# Patient Record
Sex: FEMALE | Race: WHITE | NOT HISPANIC OR LATINO
[De-identification: names, ages, dates, MRNs, and addresses within clinical notes are randomized per-mention and may not be internally consistent; named-entity substitution may affect disease eponyms.]

---

## 2019-01-15 ENCOUNTER — NON-APPOINTMENT (OUTPATIENT)
Age: 54
End: 2019-01-15

## 2019-01-15 ENCOUNTER — APPOINTMENT (OUTPATIENT)
Dept: CARDIOLOGY | Facility: CLINIC | Age: 54
End: 2019-01-15
Payer: COMMERCIAL

## 2019-01-15 VITALS
DIASTOLIC BLOOD PRESSURE: 74 MMHG | HEART RATE: 74 BPM | WEIGHT: 185 LBS | HEIGHT: 66 IN | SYSTOLIC BLOOD PRESSURE: 118 MMHG | BODY MASS INDEX: 29.73 KG/M2

## 2019-01-15 DIAGNOSIS — Z86.69 PERSONAL HISTORY OF OTHER DISEASES OF THE NERVOUS SYSTEM AND SENSE ORGANS: ICD-10-CM

## 2019-01-15 DIAGNOSIS — K20.0 EOSINOPHILIC ESOPHAGITIS: ICD-10-CM

## 2019-01-15 DIAGNOSIS — Z87.39 PERSONAL HISTORY OF OTHER DISEASES OF THE MUSCULOSKELETAL SYSTEM AND CONNECTIVE TISSUE: ICD-10-CM

## 2019-01-15 DIAGNOSIS — Z86.39 PERSONAL HISTORY OF OTHER ENDOCRINE, NUTRITIONAL AND METABOLIC DISEASE: ICD-10-CM

## 2019-01-15 DIAGNOSIS — Z86.19 PERSONAL HISTORY OF OTHER INFECTIOUS AND PARASITIC DISEASES: ICD-10-CM

## 2019-01-15 DIAGNOSIS — Z82.49 FAMILY HISTORY OF ISCHEMIC HEART DISEASE AND OTHER DISEASES OF THE CIRCULATORY SYSTEM: ICD-10-CM

## 2019-01-15 DIAGNOSIS — Z87.19 PERSONAL HISTORY OF OTHER DISEASES OF THE DIGESTIVE SYSTEM: ICD-10-CM

## 2019-01-15 DIAGNOSIS — Z85.828 PERSONAL HISTORY OF OTHER MALIGNANT NEOPLASM OF SKIN: ICD-10-CM

## 2019-01-15 PROCEDURE — 93000 ELECTROCARDIOGRAM COMPLETE: CPT

## 2019-01-15 PROCEDURE — 99204 OFFICE O/P NEW MOD 45 MIN: CPT

## 2019-01-15 RX ORDER — ALBUTEROL SULFATE 90 UG/1
108 (90 BASE) AEROSOL, METERED RESPIRATORY (INHALATION)
Refills: 0 | Status: ACTIVE | COMMUNITY

## 2019-01-15 NOTE — ASSESSMENT
[FreeTextEntry1] : Chest discomfort. Palpitations. Abnormal EKG. \par Unable to achieve adequate heart rate on outside exercise tolerance test. \par Remote heart murmur. \par Hyperlipidemia. \par \par Nuclear stress test to evaluate cardiovascular response to exercise.\par Echocardiogram to rule out structural heart disease. \par Carotid Doppler. \par  \par Daily statin therapy. \par \par Follow up event monitor results from outside office.

## 2019-01-15 NOTE — HISTORY OF PRESENT ILLNESS
[FreeTextEntry1] : SEAN SHIN  is a 53 year old  F\par There is a history of Lyme disease, hyperlipidemia, GERD, asthma, and anxiety. \par Told of heart murmur at [facility]. \par Remote evaluation with Dr. Lorenzo, symptoms attributed to lyme disease.\par \par There is no prior history of a clinical myocardial infarction, coronary revascularization. \par There is no history of symptomatic congestive heart failure rheumatic heart disease\par There is no history of symptomatic arrhythmias including atrial fibrillation.\par \par Has episodes of chest discomfort. Chest discomfort, described as substernal with radiation to the left side, sharp, relates to stress. \par Reccurrent episodes, which last a brief period \par Resolves on its own.  No clear precipitant. \par Moderate to severe intensity 7/10 intensity \par Feels the need to grab her chest\par Associated sensation of her heart racing.\par Saw [facility]. At that time,  unable to get her heart rate up on the treadmill. \par Given MCOT monitor\par Presents for further evaluation. \par There is no significant dyspnea on exertion or orthopnea. \par \par Prior musculoskeletal injury from trauma with resulting surgery.\par  \par EKG demonstrates normal sinus rhythm with nonspecific ST changes. \par \par Her mother has history of carotid disease and prior stroke. \par \par Last blood work, October 2018. , total cholesterol 262, potassium 4.3, hemoglobin 13.2. TSH 2.1.

## 2019-01-15 NOTE — REASON FOR VISIT
[Consultation] : a consultation regarding [Abnormal ECG] : an abnormal ECG [Chest Pain] : chest pain

## 2019-01-15 NOTE — PHYSICAL EXAM
[General Appearance - Well Developed] : well developed [Normal Appearance] : normal appearance [Well Groomed] : well groomed [General Appearance - Well Nourished] : well nourished [No Deformities] : no deformities [General Appearance - In No Acute Distress] : no acute distress [Normal Conjunctiva] : the conjunctiva exhibited no abnormalities [Eyelids - No Xanthelasma] : the eyelids demonstrated no xanthelasmas [Normal Oral Mucosa] : normal oral mucosa [No Oral Pallor] : no oral pallor [No Oral Cyanosis] : no oral cyanosis [Normal Jugular Venous A Waves Present] : normal jugular venous A waves present [Normal Jugular Venous V Waves Present] : normal jugular venous V waves present [No Jugular Venous Cruz A Waves] : no jugular venous cruz A waves [Heart Rate And Rhythm] : heart rate and rhythm were normal [Heart Sounds] : normal S1 and S2 [Respiration, Rhythm And Depth] : normal respiratory rhythm and effort [Exaggerated Use Of Accessory Muscles For Inspiration] : no accessory muscle use [Auscultation Breath Sounds / Voice Sounds] : lungs were clear to auscultation bilaterally [Abdomen Soft] : soft [Abdomen Tenderness] : non-tender [Abdomen Mass (___ Cm)] : no abdominal mass palpated [Abnormal Walk] : normal gait [Gait - Sufficient For Exercise Testing] : the gait was sufficient for exercise testing [Nail Clubbing] : no clubbing of the fingernails [Cyanosis, Localized] : no localized cyanosis [Petechial Hemorrhages (___cm)] : no petechial hemorrhages [Skin Color & Pigmentation] : normal skin color and pigmentation [] : no rash [No Venous Stasis] : no venous stasis [Skin Lesions] : no skin lesions [No Skin Ulcers] : no skin ulcer [No Xanthoma] : no  xanthoma was observed [Oriented To Time, Place, And Person] : oriented to person, place, and time [Affect] : the affect was normal [Mood] : the mood was normal [FreeTextEntry1] : anxiety

## 2019-01-15 NOTE — REVIEW OF SYSTEMS
[Feeling Fatigued] : feeling fatigued [Chest Pain] : chest pain [Palpitations] : palpitations [Joint Pain] : joint pain [Joint Stiffness] : joint stiffness [Dizziness] : dizziness [see HPI] : see HPI [Anxiety] : anxiety [Under Stress] : under stress [Negative] : Heme/Lymph

## 2019-01-17 ENCOUNTER — TRANSCRIPTION ENCOUNTER (OUTPATIENT)
Age: 54
End: 2019-01-17

## 2019-01-18 ENCOUNTER — APPOINTMENT (OUTPATIENT)
Dept: CARDIOLOGY | Facility: CLINIC | Age: 54
End: 2019-01-18

## 2019-01-30 ENCOUNTER — APPOINTMENT (OUTPATIENT)
Dept: CARDIOLOGY | Facility: CLINIC | Age: 54
End: 2019-01-30
Payer: COMMERCIAL

## 2019-01-30 PROCEDURE — 78452 HT MUSCLE IMAGE SPECT MULT: CPT

## 2019-01-30 PROCEDURE — 93015 CV STRESS TEST SUPVJ I&R: CPT

## 2019-01-30 PROCEDURE — 93880 EXTRACRANIAL BILAT STUDY: CPT

## 2019-01-30 PROCEDURE — 93306 TTE W/DOPPLER COMPLETE: CPT

## 2019-01-30 PROCEDURE — A9502: CPT

## 2019-02-04 ENCOUNTER — APPOINTMENT (OUTPATIENT)
Dept: CARDIOLOGY | Facility: CLINIC | Age: 54
End: 2019-02-04
Payer: COMMERCIAL

## 2019-02-04 VITALS
HEART RATE: 80 BPM | SYSTOLIC BLOOD PRESSURE: 118 MMHG | BODY MASS INDEX: 29.73 KG/M2 | DIASTOLIC BLOOD PRESSURE: 68 MMHG | HEIGHT: 66 IN | WEIGHT: 185 LBS

## 2019-02-04 PROCEDURE — 99214 OFFICE O/P EST MOD 30 MIN: CPT

## 2019-02-04 NOTE — REVIEW OF SYSTEMS
[Palpitations] : palpitations [Joint Pain] : joint pain [Joint Stiffness] : joint stiffness [see HPI] : see HPI [Anxiety] : anxiety [Under Stress] : under stress [Negative] : Heme/Lymph [Feeling Fatigued] : not feeling fatigued [Chest Pain] : no chest pain [Dizziness] : no dizziness

## 2019-02-04 NOTE — PHYSICAL EXAM
[General Appearance - Well Developed] : well developed [Normal Appearance] : normal appearance [Well Groomed] : well groomed [General Appearance - Well Nourished] : well nourished [No Deformities] : no deformities [General Appearance - In No Acute Distress] : no acute distress [Normal Conjunctiva] : the conjunctiva exhibited no abnormalities [Eyelids - No Xanthelasma] : the eyelids demonstrated no xanthelasmas [Normal Oral Mucosa] : normal oral mucosa [No Oral Pallor] : no oral pallor [No Oral Cyanosis] : no oral cyanosis [Normal Jugular Venous A Waves Present] : normal jugular venous A waves present [Normal Jugular Venous V Waves Present] : normal jugular venous V waves present [No Jugular Venous Cruz A Waves] : no jugular venous cruz A waves [Respiration, Rhythm And Depth] : normal respiratory rhythm and effort [Exaggerated Use Of Accessory Muscles For Inspiration] : no accessory muscle use [Auscultation Breath Sounds / Voice Sounds] : lungs were clear to auscultation bilaterally [Heart Rate And Rhythm] : heart rate and rhythm were normal [Heart Sounds] : normal S1 and S2 [Abdomen Soft] : soft [Abdomen Tenderness] : non-tender [Abdomen Mass (___ Cm)] : no abdominal mass palpated [Abnormal Walk] : normal gait [Gait - Sufficient For Exercise Testing] : the gait was sufficient for exercise testing [Nail Clubbing] : no clubbing of the fingernails [Cyanosis, Localized] : no localized cyanosis [Petechial Hemorrhages (___cm)] : no petechial hemorrhages [Skin Color & Pigmentation] : normal skin color and pigmentation [] : no rash [No Venous Stasis] : no venous stasis [Skin Lesions] : no skin lesions [No Skin Ulcers] : no skin ulcer [No Xanthoma] : no  xanthoma was observed [Oriented To Time, Place, And Person] : oriented to person, place, and time [Affect] : the affect was normal [Mood] : the mood was normal [FreeTextEntry1] : anxiety

## 2019-02-04 NOTE — ASSESSMENT
[FreeTextEntry1] : SEAN SHIN  is a 53 year F  who presents today Feb 04, 2019 in clinical follow-up with the above history and the following active issues. \par \par Prior complaints of chest discomfort with history of  Abnormal EKG. \par Unable to achieve adequate heart rate on outside exercise tolerance test. \par Nuclear stress test without evidence of ischemia.  Echocardiogram demonstrates normal resting cardiac structure and function with an ejection fraction of 60%.  Limitations of noninvasive testing reviewed with the patient.\par \par Carotid duplex with mild nonobstructive disease.  Incidental finding of right thyroid nodule.  Prescription for dedicated thyroid ultrasound provided.  Patient understands the importance of clinical followup with primary care and endocrinology.\par \par Hyperlipidemia, low cholesterol diet reviewed. Lifestyle and risk factor modification. \par Daily statin therapy. \par \par Follow up event monitor results from outside office.\par \par Clinical follow-up with Dr. Toledo in 6 months unless symptoms warrant sooner emergent evaluation\par \par Red flag symptoms which would warrant sooner emergent evaluation reviewed with the patient. \par Questions and concerns were addressed and answered. \par \par Sincerely,\par \par Martha Iverson PA-C\par Patients history, testing and plan reviewed with supervising MD: Dr. Larissa Mccoy

## 2019-02-04 NOTE — HISTORY OF PRESENT ILLNESS
[FreeTextEntry1] : SEAN SHIN  is a 53 year old  F\par There is a history of Lyme disease, hyperlipidemia, GERD, asthma, and anxiety. \par Told of heart murmur at [facility]. \par Remote evaluation with Dr. Lorenzo, symptoms attributed to lyme disease.\par \par At her last office visit on January 15, 2019 echocardiogram, carotid duplex and nuclear stress test recommended.  Testing has been performed and she presents today to review the results.  She previously had a cardiac event monitor placed at [facility].  Patient states that she wore the monitor for 2 weeks.  She is not certain of the company but will call our office with the information so we can track down the results.\par Since last seen there have been no recurrent episodes of chest pain.\par There have been occasional episodes of palpitations - usually at night. \par \par There is no prior history of a clinical myocardial infarction, coronary revascularization. \par There is no history of symptomatic congestive heart failure rheumatic heart disease\par There is no history of symptomatic arrhythmias including atrial fibrillation.\par \par Prior episodes of chest pain and discomfort. \par Evaluated at  cardiology. At that time,  unable to get her heart rate up on the treadmill. \par Given MCOT monitor\par \par \par Today he denies chest pain, pressure, unusual shortness of breath, lightheadedness, dizziness, near syncope or syncope. \par \par Prior musculoskeletal injury from trauma with resulting surgery.\par \par Carotid duplex January 30, 2019 nonobstructive disease, right thyroid nodules noted\par \par Echocardiogram January 30, 2019 ejection fraction 60%, a descending aorta 3.9 cm, minimal mitral regurgitation\par \par Nuclear stress test January 30, 2019 no evidence of ischemia, breast attenuation noted\par  \par EKG demonstrates normal sinus rhythm with nonspecific ST changes. \par \par Her mother has history of carotid disease and prior stroke. \par \par Last blood work, October 2018. , total cholesterol 262, potassium 4.3, hemoglobin 13.2. TSH 2.1.

## 2019-08-13 ENCOUNTER — APPOINTMENT (OUTPATIENT)
Dept: CARDIOLOGY | Facility: CLINIC | Age: 54
End: 2019-08-13
Payer: MEDICARE

## 2019-08-13 VITALS
HEIGHT: 66 IN | OXYGEN SATURATION: 95 % | DIASTOLIC BLOOD PRESSURE: 60 MMHG | BODY MASS INDEX: 28.93 KG/M2 | WEIGHT: 180 LBS | SYSTOLIC BLOOD PRESSURE: 110 MMHG | HEART RATE: 74 BPM

## 2019-08-13 DIAGNOSIS — J45.909 UNSPECIFIED ASTHMA, UNCOMPLICATED: ICD-10-CM

## 2019-08-13 PROCEDURE — 0296T: CPT

## 2019-08-13 PROCEDURE — 99214 OFFICE O/P EST MOD 30 MIN: CPT

## 2019-08-13 RX ORDER — GABAPENTIN 300 MG
300 TABLET ORAL 3 TIMES DAILY
Refills: 0 | Status: DISCONTINUED | COMMUNITY
End: 2019-08-13

## 2019-08-18 PROBLEM — J45.909 ASTHMA: Status: ACTIVE | Noted: 2019-01-15

## 2019-09-02 NOTE — HISTORY OF PRESENT ILLNESS
[FreeTextEntry1] : SEAN SHIN  is a 54 year old  F\par There is a history of Lyme disease, hyperlipidemia, GERD, asthma, and anxiety. \par Told of heart murmur at [facility]. \par Remote evaluation with Dr. Lorenzo, symptoms attributed to lyme disease.\par \par She previously had a cardiac event monitor placed at [facility] earlier this year.  Patient states that she wore the monitor for 2 weeks.  She is not certain of the company.  VIVIEN reviewed. Short asymptomatic runs of SVT.  \par Since last seen there have been no recurrent episodes of chest pain.\par There are worsening palpitations and dizziness.   \par \par There is no prior history of a clinical myocardial infarction, coronary revascularization. \par There is no history of symptomatic congestive heart failure rheumatic heart disease\par There is no history of symptomatic arrhythmias including atrial fibrillation.\par \par Prior episodes of chest pain and discomfort. \par Evaluated at  cardiology. At that time,  unable to get her heart rate up on the treadmill. \par \par Today she denies chest pain, pressure, unusual shortness of breath, near syncope or syncope. \par \par Prior musculoskeletal injury from trauma with resulting surgery.\par \par Carotid duplex January 30, 2019 nonobstructive disease, right thyroid nodules noted\par \par Echocardiogram January 30, 2019 ejection fraction 60%, a descending aorta 3.9 cm, minimal mitral regurgitation\par \par Nuclear stress test January 30, 2019 no evidence of ischemia, breast attenuation noted\par  \par EKG demonstrates normal sinus rhythm with nonspecific ST changes. \par \par Her mother has history of carotid disease and prior stroke. \par \par Last blood work, October 2018. , total cholesterol 262, potassium 4.3, hemoglobin 13.2. TSH 2.1.

## 2019-09-02 NOTE — ADDENDUM
[FreeTextEntry1] : Please note the patient was reviewed with PA Rigoberto Hamilton.\minnie I was physically present during the service of the patient and was directly involved in the management plan and recommendations of care provided to the patient. \par I personally reviewed the history and physical examination as documented by the PA above\par

## 2019-09-02 NOTE — REASON FOR VISIT
[Abnormal ECG] : an abnormal ECG [Chest Pain] : chest pain [Follow-Up - Clinic] : a clinic follow-up of [FreeTextEntry2] : 6 month follow up.

## 2019-09-02 NOTE — ASSESSMENT
[FreeTextEntry1] : SEAN SHIN  is a 54 year F  who presents today with the above history and the following active issues. \par \par Prior complaints of chest discomfort with history of  Abnormal EKG. Now resolved.  No evidence of ishcemia no nuclear stress test. No significant cardiomyopathy or valvulopathy on echo.  \par \par Carotid duplex with mild nonobstructive disease.  Incidental finding of right thyroid nodule.  Pt following up with PMD. \par \par Palpitations/Dizziness:  Daily basis.  Worsening.  1 week Zio for further evaluation. F/U to review. \par \par Hyperlipidemia, low cholesterol diet reviewed. Lifestyle and risk factor modification. \par Daily statin therapy. \par \par Follow up event monitor results from outside office.\par \par Clinical follow-up with Dr. Toledo in 6 months unless symptoms warrant sooner emergent evaluation\par \par Red flag symptoms which would warrant sooner emergent evaluation reviewed with the patient. \par Questions and concerns were addressed and answered.

## 2019-09-04 ENCOUNTER — APPOINTMENT (OUTPATIENT)
Dept: CARDIOLOGY | Facility: CLINIC | Age: 54
End: 2019-09-04
Payer: COMMERCIAL

## 2019-09-04 VITALS
SYSTOLIC BLOOD PRESSURE: 130 MMHG | BODY MASS INDEX: 28.93 KG/M2 | HEIGHT: 66 IN | OXYGEN SATURATION: 98 % | HEART RATE: 88 BPM | DIASTOLIC BLOOD PRESSURE: 80 MMHG | WEIGHT: 180 LBS

## 2019-09-04 PROCEDURE — 99214 OFFICE O/P EST MOD 30 MIN: CPT

## 2019-09-04 NOTE — HISTORY OF PRESENT ILLNESS
[FreeTextEntry1] : SEAN SHIN  is a 54 year old  F\par There is a history of Lyme disease, hyperlipidemia, GERD, asthma, and anxiety. \par Told of heart murmur at [facility]. \par Remote evaluation with Dr. Lorenzo, symptoms attributed to lyme disease.\par \par She previously had a cardiac event monitor placed at [facility] earlier this year.  Patient states that she wore the monitor for 2 weeks.  She is not certain of the company.  MCOT reviewed. Short asymptomatic runs of SVT.  There were worsening palpitations and dizziness.   Repeat Zio patch for 1 week.  Significant artifact as device was slipping off.  No episodes while wearing it.  Brief runs of SVT noted.  \par \par Notes continued chest pain.  Sharp.  Radiates up to Lt shoulder.  Sporadic.  Resolve on their own.  Not related to exercise.  \par \par Prior musculoskeletal injury from trauma with resulting surgery.\par \par There is no prior history of a clinical myocardial infarction, coronary revascularization. \par There is no history of symptomatic congestive heart failure rheumatic heart disease\par There is no history of symptomatic arrhythmias including atrial fibrillation.\par \par Carotid duplex January 30, 2019 nonobstructive disease, right thyroid nodules noted\par \par Echocardiogram January 30, 2019 ejection fraction 60%, a descending aorta 3.9 cm, minimal mitral regurgitation\par \par Nuclear stress test January 30, 2019 no evidence of ischemia, breast attenuation noted\par  \par EKG demonstrates normal sinus rhythm with nonspecific ST changes. \par \par Her mother has history of carotid disease and prior stroke. \par \par Last blood work, October 2018. , total cholesterol 262, potassium 4.3, hemoglobin 13.2. TSH 2.1.

## 2019-09-04 NOTE — REVIEW OF SYSTEMS
[Palpitations] : palpitations [Chest Pain] : chest pain [Joint Pain] : joint pain [Joint Stiffness] : joint stiffness [see HPI] : see HPI [Anxiety] : anxiety [Under Stress] : under stress [Negative] : Heme/Lymph [Feeling Fatigued] : not feeling fatigued [Dizziness] : no dizziness

## 2019-09-04 NOTE — REASON FOR VISIT
[Follow-Up - Clinic] : a clinic follow-up of [Chest Pain] : chest pain [Abnormal ECG] : an abnormal ECG [FreeTextEntry2] : Review Zio patch results.

## 2019-09-04 NOTE — PHYSICAL EXAM
[Normal Appearance] : normal appearance [General Appearance - Well Developed] : well developed [Well Groomed] : well groomed [General Appearance - Well Nourished] : well nourished [No Deformities] : no deformities [General Appearance - In No Acute Distress] : no acute distress [Eyelids - No Xanthelasma] : the eyelids demonstrated no xanthelasmas [Normal Conjunctiva] : the conjunctiva exhibited no abnormalities [Normal Oral Mucosa] : normal oral mucosa [No Oral Pallor] : no oral pallor [No Oral Cyanosis] : no oral cyanosis [Normal Jugular Venous A Waves Present] : normal jugular venous A waves present [Normal Jugular Venous V Waves Present] : normal jugular venous V waves present [No Jugular Venous Cruz A Waves] : no jugular venous cruz A waves [Respiration, Rhythm And Depth] : normal respiratory rhythm and effort [Exaggerated Use Of Accessory Muscles For Inspiration] : no accessory muscle use [Heart Rate And Rhythm] : heart rate and rhythm were normal [Auscultation Breath Sounds / Voice Sounds] : lungs were clear to auscultation bilaterally [Heart Sounds] : normal S1 and S2 [Abdomen Soft] : soft [Abdomen Tenderness] : non-tender [Abdomen Mass (___ Cm)] : no abdominal mass palpated [Abnormal Walk] : normal gait [Gait - Sufficient For Exercise Testing] : the gait was sufficient for exercise testing [Cyanosis, Localized] : no localized cyanosis [Nail Clubbing] : no clubbing of the fingernails [Skin Color & Pigmentation] : normal skin color and pigmentation [Petechial Hemorrhages (___cm)] : no petechial hemorrhages [No Venous Stasis] : no venous stasis [] : no rash [No Skin Ulcers] : no skin ulcer [Skin Lesions] : no skin lesions [No Xanthoma] : no  xanthoma was observed [Affect] : the affect was normal [Oriented To Time, Place, And Person] : oriented to person, place, and time [Mood] : the mood was normal [FreeTextEntry1] : anxiety

## 2019-09-05 ENCOUNTER — TRANSCRIPTION ENCOUNTER (OUTPATIENT)
Age: 54
End: 2019-09-05

## 2019-09-29 ENCOUNTER — FORM ENCOUNTER (OUTPATIENT)
Age: 54
End: 2019-09-29

## 2019-09-30 ENCOUNTER — OUTPATIENT (OUTPATIENT)
Dept: OUTPATIENT SERVICES | Facility: HOSPITAL | Age: 54
LOS: 1 days | End: 2019-09-30
Payer: COMMERCIAL

## 2019-09-30 DIAGNOSIS — R07.9 CHEST PAIN, UNSPECIFIED: ICD-10-CM

## 2019-09-30 PROCEDURE — 75574 CT ANGIO HRT W/3D IMAGE: CPT

## 2019-09-30 PROCEDURE — 75574 CT ANGIO HRT W/3D IMAGE: CPT | Mod: 26

## 2019-10-08 ENCOUNTER — APPOINTMENT (OUTPATIENT)
Dept: CARDIOLOGY | Facility: CLINIC | Age: 54
End: 2019-10-08
Payer: COMMERCIAL

## 2019-10-08 VITALS
WEIGHT: 185 LBS | DIASTOLIC BLOOD PRESSURE: 82 MMHG | OXYGEN SATURATION: 96 % | HEIGHT: 66 IN | BODY MASS INDEX: 29.73 KG/M2 | HEART RATE: 52 BPM | SYSTOLIC BLOOD PRESSURE: 122 MMHG

## 2019-10-08 DIAGNOSIS — Z87.898 PERSONAL HISTORY OF OTHER SPECIFIED CONDITIONS: ICD-10-CM

## 2019-10-08 DIAGNOSIS — R06.02 SHORTNESS OF BREATH: ICD-10-CM

## 2019-10-08 PROCEDURE — 99214 OFFICE O/P EST MOD 30 MIN: CPT

## 2019-10-08 NOTE — REASON FOR VISIT
[Follow-Up - Clinic] : a clinic follow-up of [Abnormal ECG] : an abnormal ECG [Chest Pain] : chest pain [Hyperlipidemia] : hyperlipidemia [Medication Management] : Medication management

## 2019-10-08 NOTE — HISTORY OF PRESENT ILLNESS
[FreeTextEntry1] : SEAN SHIN  is a 54 year old  F\par There is a history of Lyme disease, hyperlipidemia, GERD, asthma, and anxiety. \par Told of heart murmur at [facility]. \par Remote evaluation with Dr. Lorenzo, symptoms attributed to lyme disease.\par \par Short asymptomatic runs of SVT were noted on a remote event monitor.  There were worsening palpitations and dizziness.   Repeat Zio patch for 1 week.  Significant artifact as device was slipping off.  No symptoms while wearing it.  Brief runs of SVT noted.  She was started on low dose beta blocker at last visit. There has been symptomatic improvement. BP and HR are stable. \par \par Notes continued chest pain at rest.  Sharp.  Radiates up to Lt shoulder.  Sporadic.  Resolve on their own.  Not related to exercise.  At times a/w palpitations above. Less on beta blocker. \par \par Today, we reviewed the results of cardiac CTA. Calcium score of zero. Completely normal coronary arteries. Normal resting heart structure and function. \par \par Prior musculoskeletal injury from trauma with resulting surgery.\par \par There is no prior history of a clinical myocardial infarction, coronary revascularization. \par There is no history of symptomatic congestive heart failure rheumatic heart disease\par There is no history of symptomatic arrhythmias including atrial fibrillation.\par \par Carotid duplex January 30, 2019 nonobstructive disease, right thyroid nodules noted, US was performed and followup with endocrine was complete, no planned intervention.\par \par Echocardiogram January 30, 2019 ejection fraction 60%, ascending aorta 3.9 cm, minimal mitral regurgitation\par \par Nuclear stress test January 30, 2019 no evidence of ischemia, breast attenuation noted\par  \par EKG demonstrates normal sinus rhythm with nonspecific ST changes. \par \par Her mother has history of carotid disease and prior stroke. \par \par Last blood work, October 2018. , total cholesterol 262, potassium 4.3, hemoglobin 13.2. TSH 2.1.\par She is unclear if she was taking statin at the time these labs were drawn.

## 2019-10-08 NOTE — REVIEW OF SYSTEMS
[Feeling Fatigued] : not feeling fatigued [Chest Pain] : chest pain [Palpitations] : palpitations [Joint Pain] : joint pain [Joint Stiffness] : joint stiffness [Dizziness] : no dizziness [see HPI] : see HPI [Anxiety] : anxiety [Under Stress] : under stress [Negative] : Heme/Lymph

## 2019-10-08 NOTE — PHYSICAL EXAM
[General Appearance - Well Developed] : well developed [Normal Appearance] : normal appearance [Well Groomed] : well groomed [General Appearance - Well Nourished] : well nourished [No Deformities] : no deformities [General Appearance - In No Acute Distress] : no acute distress [Normal Conjunctiva] : the conjunctiva exhibited no abnormalities [Eyelids - No Xanthelasma] : the eyelids demonstrated no xanthelasmas [No Oral Pallor] : no oral pallor [No Oral Cyanosis] : no oral cyanosis [Normal Jugular Venous A Waves Present] : normal jugular venous A waves present [Normal Jugular Venous V Waves Present] : normal jugular venous V waves present [No Jugular Venous Cruz A Waves] : no jugular venous cruz A waves [Respiration, Rhythm And Depth] : normal respiratory rhythm and effort [Exaggerated Use Of Accessory Muscles For Inspiration] : no accessory muscle use [Auscultation Breath Sounds / Voice Sounds] : lungs were clear to auscultation bilaterally [Heart Rate And Rhythm] : heart rate and rhythm were normal [Heart Sounds] : normal S1 and S2 [Edema] : no peripheral edema present [FreeTextEntry1] : HSM apex [Abdomen Soft] : soft [Abdomen Tenderness] : non-tender [Abdomen Mass (___ Cm)] : no abdominal mass palpated [Abnormal Walk] : normal gait [Gait - Sufficient For Exercise Testing] : the gait was sufficient for exercise testing [Nail Clubbing] : no clubbing of the fingernails [Cyanosis, Localized] : no localized cyanosis [Petechial Hemorrhages (___cm)] : no petechial hemorrhages [Skin Color & Pigmentation] : normal skin color and pigmentation [] : no rash [No Venous Stasis] : no venous stasis [Skin Lesions] : no skin lesions [No Skin Ulcers] : no skin ulcer [No Xanthoma] : no  xanthoma was observed [Oriented To Time, Place, And Person] : oriented to person, place, and time [Affect] : the affect was normal [Mood] : the mood was normal

## 2020-05-28 ENCOUNTER — APPOINTMENT (OUTPATIENT)
Dept: CARDIOLOGY | Facility: CLINIC | Age: 55
End: 2020-05-28

## 2020-05-28 VITALS — WEIGHT: 164 LBS | HEIGHT: 66 IN | BODY MASS INDEX: 26.36 KG/M2

## 2020-05-28 RX ORDER — IMMUNE GLOB/PLASMA FRA BOVINE 5 G
POWDER IN PACKET (EA) ORAL
Refills: 0 | Status: DISCONTINUED | COMMUNITY
End: 2020-05-28

## 2020-05-28 RX ORDER — ROSUVASTATIN CALCIUM 10 MG/1
10 TABLET, FILM COATED ORAL DAILY
Refills: 0 | Status: DISCONTINUED | COMMUNITY
End: 2020-05-28

## 2020-05-28 RX ORDER — ACETAMINOPHEN AND CODEINE 300; 30 MG/1; MG/1
TABLET ORAL DAILY
Refills: 0 | Status: DISCONTINUED | COMMUNITY
End: 2020-05-28

## 2020-05-28 NOTE — HISTORY OF PRESENT ILLNESS
[Medical Office: ([facility])___] : at the medical office located in  [Home] : at home, [unfilled] , at the time of the visit. [FreeTextEntry1] : Time of initiation of visit: 10:00Am\par \par SEAN SHIN  is a 55 year old  F\par \par Virtual visit  (video)\par Consent: Patient consented to virtual video video visit.\par Time was spent in review of the pertinent medical records, discussion with the patient, evaluation of the patient problem, and coordination of a care plan as part of this online visit. \par No physical exam was performed as this visit was performed virtually with exceptions as noted below.  \par \par \par There is a history of Lyme disease, hyperlipidemia, GERD, asthma, and anxiety. \par Told of heart murmur at [employer]. \par Remote evaluation with Dr. Lorenzo, symptoms attributed to lyme disease.\par \par There is no prior history of a clinical myocardial infarction, coronary revascularization. \par There is no history of symptomatic congestive heart failure rheumatic heart disease\par There is no history of symptomatic arrhythmias including atrial fibrillation.\par \par She previously had a cardiac event monitor placed at [facility] earlier this year. Patient states that she wore the monitor for 2 weeks. She is not certain of the company. MCOT reviewed. Short asymptomatic runs of SVT. There were worsening palpitations and dizziness. Repeat Zio patch for 1 week. Significant artifact as device was slipping off. No episodes while wearing it. Brief runs of SVT noted. \par \par Notes continued chest pain. Sharp. Radiates up to Lt shoulder. Sporadic. Resolve on their own. Not related to exercise. \par \par Has episodes of chest discomfort. Chest discomfort, described as substernal with radiation to the left side, sharp, relates to stress. \par Reccurrent episodes, which last a brief period \par Resolves on its own. No clear precipitant. \par Moderate to severe intensity 7/10 intensity \par Feels the need to grab her chest\par Associated sensation of her heart racing.\par Saw  cardiology. At that time, unable to get her heart rate up on the treadmill. \par Given MCOT monitor\par Presents for further evaluation. \par There is no significant dyspnea on exertion or orthopnea. \par \par Prior musculoskeletal injury from trauma with resulting surgery.\par \par cardiac CTA. Calcium score of zero. Completely normal coronary arteries. Normal resting heart structure and function. \par \par Carotid duplex January 30, 2019 nonobstructive disease, right thyroid nodules noted\par \par Echocardiogram January 30, 2019 ejection fraction 60%, a descending aorta 3.9 cm, minimal mitral regurgitation\par \par Nuclear stress test January 30, 2019 no evidence of ischemia, breast attenuation noted\par  \par EKG demonstrates normal sinus rhythm with nonspecific ST changes. \par \par Her mother has history of carotid disease and prior stroke. \par \par Last blood work, October 2018. , total cholesterol 262, potassium 4.3, hemoglobin 13.2. TSH 2.1. \par \par  \par Chest discomfort. Palpitations. Abnormal EKG. \par Unable to achieve adequate heart rate on outside exercise tolerance test. \par Remote heart murmur. \par Hyperlipidemia. \par \par Nuclear stress test to evaluate cardiovascular response to exercise.\par Echocardiogram to rule out structural heart disease. \par Carotid Doppler. \par  \par Daily statin therapy. \par \par Follow up event monitor results from outside office. \par  \par Nuclear stress test without evidence of ischemia. \par Echocardiogram demonstrates normal resting cardiac structure and function with an ejection fraction of 60%. \par Limitations of noninvasive testing revieed with the patient.\par Carotid duplex with mild nonobstructive disease. Incidental finding of right thyroid nodule. Prescription for dedicated thyroid ultrasound provided. Patient understands the importance of clinical followup with primary care and endocrinology.\par Hyperlipidemia, low cholesterol diet reviewed. Lifestyle and risk factor modification. \par Daily statin therapy. \par Follow up event monitor results from outside office.\par Red flag symptoms which would warrant sooner emergent evaluation reviewed with the patient. \par Questions and concerns were addressed and answered. \par \par

## 2020-06-03 ENCOUNTER — APPOINTMENT (OUTPATIENT)
Dept: CARDIOLOGY | Facility: CLINIC | Age: 55
End: 2020-06-03

## 2020-06-22 ENCOUNTER — APPOINTMENT (OUTPATIENT)
Dept: CARDIOLOGY | Facility: CLINIC | Age: 55
End: 2020-06-22
Payer: COMMERCIAL

## 2020-06-22 ENCOUNTER — NON-APPOINTMENT (OUTPATIENT)
Age: 55
End: 2020-06-22

## 2020-06-22 VITALS
HEART RATE: 53 BPM | BODY MASS INDEX: 25.07 KG/M2 | OXYGEN SATURATION: 96 % | TEMPERATURE: 97.9 F | DIASTOLIC BLOOD PRESSURE: 68 MMHG | HEIGHT: 66 IN | WEIGHT: 156 LBS | SYSTOLIC BLOOD PRESSURE: 116 MMHG

## 2020-06-22 PROCEDURE — 99214 OFFICE O/P EST MOD 30 MIN: CPT

## 2020-06-22 PROCEDURE — 93000 ELECTROCARDIOGRAM COMPLETE: CPT

## 2020-06-22 NOTE — PHYSICAL EXAM
[Normal Appearance] : normal appearance [General Appearance - Well Developed] : well developed [General Appearance - Well Nourished] : well nourished [Well Groomed] : well groomed [No Deformities] : no deformities [Normal Conjunctiva] : the conjunctiva exhibited no abnormalities [General Appearance - In No Acute Distress] : no acute distress [Eyelids - No Xanthelasma] : the eyelids demonstrated no xanthelasmas [Normal Oral Mucosa] : normal oral mucosa [No Oral Cyanosis] : no oral cyanosis [No Oral Pallor] : no oral pallor [Normal Jugular Venous V Waves Present] : normal jugular venous V waves present [Normal Jugular Venous A Waves Present] : normal jugular venous A waves present [Respiration, Rhythm And Depth] : normal respiratory rhythm and effort [No Jugular Venous Cruz A Waves] : no jugular venous cruz A waves [Exaggerated Use Of Accessory Muscles For Inspiration] : no accessory muscle use [Auscultation Breath Sounds / Voice Sounds] : lungs were clear to auscultation bilaterally [Heart Rate And Rhythm] : heart rate and rhythm were normal [Heart Sounds] : normal S1 and S2 [Murmurs] : no murmurs present [Abdomen Tenderness] : non-tender [Abdomen Soft] : soft [Abdomen Mass (___ Cm)] : no abdominal mass palpated [Abnormal Walk] : normal gait [Cyanosis, Localized] : no localized cyanosis [Gait - Sufficient For Exercise Testing] : the gait was sufficient for exercise testing [Nail Clubbing] : no clubbing of the fingernails [Petechial Hemorrhages (___cm)] : no petechial hemorrhages [Skin Color & Pigmentation] : normal skin color and pigmentation [Skin Lesions] : no skin lesions [] : no rash [No Venous Stasis] : no venous stasis [No Skin Ulcers] : no skin ulcer [No Xanthoma] : no  xanthoma was observed [Affect] : the affect was normal [Oriented To Time, Place, And Person] : oriented to person, place, and time [Mood] : the mood was normal [No Anxiety] : not feeling anxious

## 2020-06-22 NOTE — ASSESSMENT
[FreeTextEntry1] : SEAN SHIN is a 55 year old F who presents today Jun 22, 2020 with the above history and the following active issues:\par \par HLD: Not presently on a statin. Lab slip provided for risk stratification.\par \par PSVT: Very brief on prior holter monitor. On Metoprolol.\par \par Lightheadedness: /68 upon presentation to the office. Mildly orthostatic with changes in position. See vital signs above. Advised to liberalize salt intake, ensure adequate hydration, and change positions slowly. She feels well today and has no complaints.\par \par Ongoing f/u with PCP.\par \par F/U in 6 months. Lab slip provided (CBC, CMP, fasting lipid profile, CK, Mag, TSH, and Covid antibody testing).\par Discussed red flag symptoms, which would warrant sooner or emergent medical evaluation.\par Any questions and concerns were addressed and resolved.\par \par Sincerely,\par Cinthya Pritchett FNP-BC\par Patient's history, testing, and plan was reviewed with supervising physician, Dr. Alpesh Toledo

## 2020-06-22 NOTE — HISTORY OF PRESENT ILLNESS
[FreeTextEntry1] : SEAN SHIN is a 55 year old female with a past medical history of Lyme disease, hyperlipidemia, GERD, asthma, anxiety, NADEEM on CPAP, and brief PSVT on Metoprolol. \par Told of heart murmur at [email or web address]. \par Remote evaluation with Dr. Lorenzo, symptoms attributed to lyme disease.\par Incidental finding of thryoid nodule for which she is following with Dr. Guevara.\par \par There is no prior history of a clinical myocardial infarction, coronary revascularization. \par There is no history of symptomatic congestive heart failure rheumatic heart disease\par There is no history of symptomatic arrhythmias including atrial fibrillation.\par \par Last seen 10/8/20. In the interim she had a spinal stimulator implanted then explanted. There have been no hospitalizations. She reports using her inhaler once a week due to asthma. She gets brief palpitations daily. There is lightheadedness "blacking out" when she bends down to pick something up. There is NO theresa syncope. Denies CP, unusual SOB, PND, orthopnea, claudication, and edema.\par \par BP sitting right arm, 96/68, left arm 92/70\par BP standing right arm 90/70, left arm 86/70\par \par Testing:\par \par EKG 6/22/20: SR at 61 bpm, UT interval 150 ms, QTc 415 ms, PRWP, nonspecific ST-T wave abnormalities \par \par cardiac CTA 9/30/19. Calcium score of zero. Completely normal coronary arteries. Normal resting heart structure and function. \par \par Prior musculoskeletal injury from trauma with resulting surgery.\par \par \par Carotid duplex January 30, 2019 nonobstructive disease, right thyroid nodules noted, US was performed and followup with endocrine was complete, no planned intervention.\par \par Echocardiogram January 30, 2019 ejection fraction 60%, ascending aorta 3.9 cm, minimal mitral regurgitation\par \par Nuclear stress test January 30, 2019 no evidence of ischemia, breast attenuation noted\par  \par EKG demonstrates normal sinus rhythm with nonspecific ST changes. \par \par Her mother has history of carotid disease and prior stroke. \par \par Last blood work, October 2018. , total cholesterol 262, potassium 4.3, hemoglobin 13.2. TSH 2.1.\par She is unclear if she was taking statin at the time these labs were drawn.\par

## 2020-12-14 ENCOUNTER — APPOINTMENT (OUTPATIENT)
Dept: CARDIOLOGY | Facility: CLINIC | Age: 55
End: 2020-12-14
Payer: COMMERCIAL

## 2020-12-14 VITALS
BODY MASS INDEX: 24.91 KG/M2 | OXYGEN SATURATION: 94 % | WEIGHT: 155 LBS | TEMPERATURE: 97.5 F | DIASTOLIC BLOOD PRESSURE: 66 MMHG | HEIGHT: 66 IN | HEART RATE: 73 BPM | SYSTOLIC BLOOD PRESSURE: 96 MMHG

## 2020-12-14 DIAGNOSIS — E04.1 NONTOXIC SINGLE THYROID NODULE: ICD-10-CM

## 2020-12-14 PROCEDURE — 99072 ADDL SUPL MATRL&STAF TM PHE: CPT

## 2020-12-14 PROCEDURE — 99213 OFFICE O/P EST LOW 20 MIN: CPT

## 2020-12-14 RX ORDER — BUDESONIDE AND FORMOTEROL FUMARATE DIHYDRATE 80; 4.5 UG/1; UG/1
80-4.5 AEROSOL RESPIRATORY (INHALATION)
Qty: 10 | Refills: 0 | Status: ACTIVE | COMMUNITY
Start: 2020-10-08

## 2020-12-14 RX ORDER — METOPROLOL SUCCINATE 25 MG/1
25 TABLET, EXTENDED RELEASE ORAL
Qty: 90 | Refills: 2 | Status: DISCONTINUED | COMMUNITY
Start: 2019-09-04 | End: 2020-12-14

## 2020-12-14 RX ORDER — GABAPENTIN 300 MG/1
300 CAPSULE ORAL 3 TIMES DAILY
Refills: 0 | Status: DISCONTINUED | COMMUNITY
End: 2020-12-14

## 2020-12-19 NOTE — HISTORY OF PRESENT ILLNESS
[FreeTextEntry1] : \par \par SEAN SHIN is a 55 year old female with a past medical history of Lyme disease, hyperlipidemia, GERD, asthma, anxiety, NADEEM on CPAP, and brief PSVT. \par Told of heart murmur at [employer]. \par \par Remote evaluation with Dr. Lorenzo, symptoms attributed to lyme disease.\par Incidental finding of thryoid nodule for which she is following with Dr. Guevara.\par \par There is no prior history of a clinical myocardial infarction, coronary revascularization. \par There is no history of symptomatic congestive heart failure rheumatic heart disease\par There is no history of symptomatic arrhythmias including atrial fibrillation.\par \par There is NO theresa syncope. Denies CP, unusual SOB, PND, orthopnea, claudication, and edema.\par she has lost 30 pounds s\par he walks 1 to 2 miles s\par he is careful about her diet \par she is now off metoprolol \par she has rare fluttering \par \par Blood work July 2020 hemoglobin 12.7 creatinine 0.9  total cholesterol 232 \par BP sitting right arm, 96/68, left arm 92/70\par BP standing right arm 90/70, left arm 86/70\par EKG 6/22/20: SR at 61 bpm, NM interval 150 ms, QTc 415 ms, PRWP, nonspecific ST-T wave abnormalities \par cardiac CTA 9/30/19. Calcium score of zero. Completely normal coronary arteries. Normal resting heart structure and function. \par Carotid duplex January 30, 2019 nonobstructive disease, right thyroid nodules noted, US was performed and followup with endocrine was complete, no planned intervention.\par Echocardiogram January 30, 2019 ejection fraction 60%, ascending aorta 3.9 cm, minimal mitral regurgitation\par Nuclear stress test January 30, 2019 no evidence of ischemia, breast attenuation noted\par  \par Her mother has history of carotid disease and prior stroke.

## 2020-12-19 NOTE — ASSESSMENT
[FreeTextEntry1] : \par blood work has been requested \par clinical follow-up will be scheduled in 6 months\par \par HLD: Not presently on a statin. Lab slip provided for risk stratification.\par PSVT: Very brief on prior holter monitor.  Now off  Metoprolol.\par Lightheadedness:  Advised to liberalize salt intake, ensure adequate hydration, and change positions slowly. \par Discussed red flag symptoms, which would warrant sooner or emergent medical evaluation.\par Any questions and concerns were addressed and resolved.\par

## 2021-05-19 ENCOUNTER — APPOINTMENT (OUTPATIENT)
Dept: DISASTER EMERGENCY | Facility: CLINIC | Age: 56
End: 2021-05-19

## 2021-05-20 LAB — SARS-COV-2 N GENE NPH QL NAA+PROBE: NOT DETECTED

## 2021-06-09 ENCOUNTER — APPOINTMENT (OUTPATIENT)
Dept: DISASTER EMERGENCY | Facility: CLINIC | Age: 56
End: 2021-06-09

## 2021-06-09 DIAGNOSIS — Z01.818 ENCOUNTER FOR OTHER PREPROCEDURAL EXAMINATION: ICD-10-CM

## 2021-06-10 LAB — SARS-COV-2 N GENE NPH QL NAA+PROBE: NOT DETECTED

## 2021-07-07 ENCOUNTER — APPOINTMENT (OUTPATIENT)
Dept: CARDIOLOGY | Facility: CLINIC | Age: 56
End: 2021-07-07

## 2021-10-04 ENCOUNTER — OUTPATIENT (OUTPATIENT)
Dept: OUTPATIENT SERVICES | Facility: HOSPITAL | Age: 56
LOS: 1 days | End: 2021-10-04

## 2021-10-26 ENCOUNTER — NON-APPOINTMENT (OUTPATIENT)
Age: 56
End: 2021-10-26

## 2021-10-26 ENCOUNTER — APPOINTMENT (OUTPATIENT)
Dept: OPHTHALMOLOGY | Facility: CLINIC | Age: 56
End: 2021-10-26
Payer: MEDICARE

## 2021-10-26 PROCEDURE — 92250 FUNDUS PHOTOGRAPHY W/I&R: CPT

## 2021-10-26 PROCEDURE — 99204 OFFICE O/P NEW MOD 45 MIN: CPT

## 2021-11-22 ENCOUNTER — OUTPATIENT (OUTPATIENT)
Dept: OUTPATIENT SERVICES | Facility: HOSPITAL | Age: 56
LOS: 1 days | End: 2021-11-22
Payer: COMMERCIAL

## 2021-11-22 PROCEDURE — 71046 X-RAY EXAM CHEST 2 VIEWS: CPT | Mod: 26

## 2021-11-23 ENCOUNTER — NON-APPOINTMENT (OUTPATIENT)
Age: 56
End: 2021-11-23

## 2021-11-23 ENCOUNTER — APPOINTMENT (OUTPATIENT)
Dept: OPHTHALMOLOGY | Facility: CLINIC | Age: 56
End: 2021-11-23
Payer: MEDICARE

## 2021-11-23 PROCEDURE — 92134 CPTRZ OPH DX IMG PST SGM RTA: CPT

## 2021-11-23 PROCEDURE — 92014 COMPRE OPH EXAM EST PT 1/>: CPT

## 2022-01-20 ENCOUNTER — APPOINTMENT (OUTPATIENT)
Dept: OPHTHALMOLOGY | Facility: CLINIC | Age: 57
End: 2022-01-20
Payer: MEDICARE

## 2022-01-20 ENCOUNTER — NON-APPOINTMENT (OUTPATIENT)
Age: 57
End: 2022-01-20

## 2022-01-20 PROCEDURE — 92012 INTRM OPH EXAM EST PATIENT: CPT

## 2022-01-20 PROCEDURE — 92134 CPTRZ OPH DX IMG PST SGM RTA: CPT

## 2022-01-20 PROCEDURE — 76514 ECHO EXAM OF EYE THICKNESS: CPT

## 2022-01-31 ENCOUNTER — APPOINTMENT (OUTPATIENT)
Dept: CARDIOLOGY | Facility: CLINIC | Age: 57
End: 2022-01-31
Payer: MEDICARE

## 2022-01-31 ENCOUNTER — NON-APPOINTMENT (OUTPATIENT)
Age: 57
End: 2022-01-31

## 2022-01-31 VITALS
WEIGHT: 169 LBS | HEART RATE: 84 BPM | TEMPERATURE: 98 F | HEIGHT: 66 IN | OXYGEN SATURATION: 97 % | BODY MASS INDEX: 27.16 KG/M2 | DIASTOLIC BLOOD PRESSURE: 70 MMHG | SYSTOLIC BLOOD PRESSURE: 110 MMHG

## 2022-01-31 PROCEDURE — 99213 OFFICE O/P EST LOW 20 MIN: CPT

## 2022-01-31 PROCEDURE — 93000 ELECTROCARDIOGRAM COMPLETE: CPT

## 2022-01-31 RX ORDER — NYSTATIN 500000 [USP'U]/1
500000 TABLET, FILM COATED ORAL
Qty: 30 | Refills: 0 | Status: DISCONTINUED | COMMUNITY
Start: 2020-12-02 | End: 2022-01-31

## 2022-01-31 RX ORDER — CYCLOBENZAPRINE HYDROCHLORIDE 10 MG/1
10 TABLET, FILM COATED ORAL
Refills: 0 | Status: DISCONTINUED | COMMUNITY
End: 2022-01-31

## 2022-01-31 RX ORDER — LISDEXAMFETAMINE DIMESYLATE 10 MG/1
10 CAPSULE ORAL
Qty: 20 | Refills: 0 | Status: DISCONTINUED | COMMUNITY
Start: 2020-12-02 | End: 2022-01-31

## 2022-01-31 RX ORDER — AZELASTINE HYDROCHLORIDE 137 UG/1
0.1 SPRAY, METERED NASAL
Qty: 30 | Refills: 0 | Status: DISCONTINUED | COMMUNITY
Start: 2020-10-08 | End: 2022-01-31

## 2022-01-31 RX ORDER — FLUCONAZOLE 200 MG/1
200 TABLET ORAL DAILY
Refills: 0 | Status: DISCONTINUED | COMMUNITY
End: 2022-01-31

## 2022-01-31 RX ORDER — COLCHICINE 0.6 MG/1
0.6 CAPSULE ORAL
Qty: 32 | Refills: 0 | Status: DISCONTINUED | COMMUNITY
Start: 2020-12-02 | End: 2022-01-31

## 2022-01-31 RX ORDER — FAMOTIDINE 40 MG/1
40 TABLET, FILM COATED ORAL
Qty: 30 | Refills: 0 | Status: DISCONTINUED | COMMUNITY
Start: 2020-03-10 | End: 2022-01-31

## 2022-01-31 RX ORDER — CYANOCOBALAMIN 1000 UG/ML
1000 INJECTION INTRAMUSCULAR; SUBCUTANEOUS
Qty: 4 | Refills: 0 | Status: DISCONTINUED | COMMUNITY
Start: 2020-12-02 | End: 2022-01-31

## 2022-01-31 RX ORDER — DIFLUPREDNATE 0.5 MG/ML
0.05 EMULSION OPHTHALMIC
Refills: 0 | Status: DISCONTINUED | COMMUNITY
End: 2022-01-31

## 2022-01-31 RX ORDER — ZOLPIDEM TARTRATE 5 MG/1
TABLET, FILM COATED ORAL
Refills: 0 | Status: DISCONTINUED | COMMUNITY
End: 2022-01-31

## 2022-01-31 RX ORDER — DULOXETINE HYDROCHLORIDE 20 MG/1
20 CAPSULE, DELAYED RELEASE PELLETS ORAL
Qty: 30 | Refills: 0 | Status: DISCONTINUED | COMMUNITY
Start: 2020-12-02 | End: 2022-01-31

## 2022-01-31 RX ORDER — OMEPRAZOLE 40 MG/1
CAPSULE, DELAYED RELEASE ORAL
Refills: 0 | Status: DISCONTINUED | COMMUNITY
End: 2022-01-31

## 2022-01-31 NOTE — REASON FOR VISIT
[Symptom and Test Evaluation] : symptom and test evaluation [Structural Heart and Valve Disease] : structural heart and valve disease [Hyperlipidemia] : hyperlipidemia

## 2022-02-10 ENCOUNTER — APPOINTMENT (OUTPATIENT)
Dept: CARDIOLOGY | Facility: CLINIC | Age: 57
End: 2022-02-10
Payer: MEDICARE

## 2022-02-10 PROCEDURE — 93979 VASCULAR STUDY: CPT

## 2022-02-10 PROCEDURE — 93306 TTE W/DOPPLER COMPLETE: CPT

## 2022-02-12 NOTE — ASSESSMENT
[FreeTextEntry1] : echocardiogram to monitor aortic dimensions \par PSVT: Very brief on prior holter monitor.  Now off  Metoprolol.\par Lightheadedness:  Advised to liberalize salt intake, ensure adequate hydration, and change positions slowly. \par Discussed red flag symptoms, which would warrant sooner or emergent medical evaluation.\par Any questions and concerns were addressed and resolved.\par

## 2022-02-12 NOTE — HISTORY OF PRESENT ILLNESS
[FreeTextEntry1] : SEAN SHIN  is a 56 year old  F\par  \par with a past medical history of Lyme disease, hyperlipidemia, GERD, asthma, anxiety, NADEEM on CPAP, and brief PSVT. \par Told of heart murmur at [employer]. \par \par Remote evaluation with Dr. Lorenzo, symptoms attributed to lyme disease.\par Incidental finding of thryoid nodule for which she is following with Dr. Guevara.  Not sig\par \par There is no prior history of a clinical myocardial infarction, coronary revascularization. \par There is no history of symptomatic congestive heart failure rheumatic heart disease\par There is no history of symptomatic arrhythmias including atrial fibrillation.\par \par Denies CP, unusual SOB, PND, orthopnea, syncope, claudication, and edema.\par rare fluttering \par walks up to 4 miles\par \par in the interim she developed uveitis and has been on steroids \par there was minor weight gain \par remains physically active \par Today's EKG demonstrates normal sinus rhythm with nonspecific ST changes \par September 2021 hemoglobin 13.5 potassium 3.9 creatinine 0.8 TSH 1.5\par \par cardiac CTA 9/30/19. Calcium score of zero. normal coronary arteries. Normal resting heart structure and function. \par Carotid duplex January 30, 2019 nonobstructive disease, right thyroid nodules noted, US was performed and followup with endocrine was complete, no planned intervention.\par Echocardiogram January 30, 2019 ejection fraction 60%, ascending aorta 3.9 cm, minimal mitral regurgitation\par  \par Her mother has history of carotid disease and prior stroke.

## 2022-02-14 ENCOUNTER — NON-APPOINTMENT (OUTPATIENT)
Age: 57
End: 2022-02-14

## 2022-03-22 ENCOUNTER — APPOINTMENT (OUTPATIENT)
Dept: OPHTHALMOLOGY | Facility: CLINIC | Age: 57
End: 2022-03-22
Payer: MEDICARE

## 2022-03-22 ENCOUNTER — NON-APPOINTMENT (OUTPATIENT)
Age: 57
End: 2022-03-22

## 2022-03-22 PROCEDURE — 92012 INTRM OPH EXAM EST PATIENT: CPT

## 2022-03-22 PROCEDURE — 92083 EXTENDED VISUAL FIELD XM: CPT

## 2022-03-22 PROCEDURE — 92134 CPTRZ OPH DX IMG PST SGM RTA: CPT

## 2022-05-24 ENCOUNTER — NON-APPOINTMENT (OUTPATIENT)
Age: 57
End: 2022-05-24

## 2022-05-24 ENCOUNTER — APPOINTMENT (OUTPATIENT)
Dept: OPHTHALMOLOGY | Facility: CLINIC | Age: 57
End: 2022-05-24
Payer: MEDICARE

## 2022-05-24 PROCEDURE — 92250 FUNDUS PHOTOGRAPHY W/I&R: CPT

## 2022-05-24 PROCEDURE — 92014 COMPRE OPH EXAM EST PT 1/>: CPT

## 2022-08-30 ENCOUNTER — NON-APPOINTMENT (OUTPATIENT)
Age: 57
End: 2022-08-30

## 2022-08-30 ENCOUNTER — APPOINTMENT (OUTPATIENT)
Dept: OPHTHALMOLOGY | Facility: CLINIC | Age: 57
End: 2022-08-30

## 2022-08-30 PROCEDURE — 92012 INTRM OPH EXAM EST PATIENT: CPT

## 2022-08-30 PROCEDURE — 92134 CPTRZ OPH DX IMG PST SGM RTA: CPT

## 2022-08-30 PROCEDURE — 92235 FLUORESCEIN ANGRPH MLTIFRAME: CPT

## 2022-11-28 ENCOUNTER — NON-APPOINTMENT (OUTPATIENT)
Age: 57
End: 2022-11-28

## 2022-11-28 ENCOUNTER — APPOINTMENT (OUTPATIENT)
Dept: CARDIOLOGY | Facility: CLINIC | Age: 57
End: 2022-11-28

## 2022-11-28 VITALS
HEART RATE: 83 BPM | TEMPERATURE: 98 F | BODY MASS INDEX: 27 KG/M2 | DIASTOLIC BLOOD PRESSURE: 80 MMHG | OXYGEN SATURATION: 97 % | HEIGHT: 66 IN | WEIGHT: 168 LBS | SYSTOLIC BLOOD PRESSURE: 110 MMHG

## 2022-11-28 PROCEDURE — 99214 OFFICE O/P EST MOD 30 MIN: CPT

## 2022-11-28 RX ORDER — BECLOMETHASONE DIPROPIONATE 80 UG/1
80 AEROSOL, METERED RESPIRATORY (INHALATION)
Refills: 0 | Status: DISCONTINUED | COMMUNITY
End: 2022-11-28

## 2022-11-28 NOTE — ASSESSMENT
[FreeTextEntry1] : SEAN SHIN is a 57 year old F who presents today Nov 28, 2022 with the above history and the following active issues:\par \par ACP\par Palps\par Post COVID, fatigue\par Obtain 2d echocardiogram to r/o pericardial disease\par ETT to r/o ischemia, eval CV response. \par 7 day zio monitor to r/o arrhythmia. \par \par PSVT: Very brief on prior holter monitor.  Now off  Metoprolol.\par \par Asthma: Keep f/u allergist, PCP. \par \par Fwd upcoming labs to our office. \par F/U after above testing. \par Discussed red flag symptoms, which would warrant sooner or emergent medical evaluation.\par Any questions and concerns were addressed and resolved.\par \par Sincerely,\par \par FOUZIA Musa\par Patients history, testing, and plan reviewed with supervising MD: Dr. Huber Ramirez

## 2022-11-28 NOTE — HISTORY OF PRESENT ILLNESS
[FreeTextEntry1] : SEAN SHIN  is a 57 year old  F\par  \par with a past medical history of Lyme disease, hyperlipidemia, GERD, asthma, anxiety, NADEEM on CPAP, and brief PSVT. \par Told of heart murmur at [employer]. \par \par Remote evaluation with Dr. Lorenzo, symptoms attributed to lyme disease.\par Incidental finding of thyroid nodule for which she is following with Dr. Guevara.  Not sig\par \par There is no prior history of a clinical myocardial infarction, coronary revascularization. \par There is no history of symptomatic congestive heart failure rheumatic heart disease\par There is no history of symptomatic arrhythmias including atrial fibrillation.\par \par walks up to 4 miles every night\par Interim had COVID Sept 2022 recovered at home without txt, main sx were wheezing resolved with albuterol, fatigue and body aches. \par She now has persistent fatigue, random L sided CP not exertional, and irreg heart beat like a "gurgle" every so often. \par Her allergist reportedly did lung test which showed stable asthma and pt was told to see cardio. \par EKG 11/28/22 normal sinus rhythm \par Seeing PCP tomorrow for updated labs. \par \par September 2021 hemoglobin 13.5 potassium 3.9 creatinine 0.8 TSH 1.5\par cardiac CTA 9/30/19. Calcium score of zero. normal coronary arteries. Normal resting heart structure and function. \par Carotid duplex January 30, 2019 nonobstructive disease, right thyroid nodules noted, US was performed and followup with endocrine was complete, no planned intervention.\par Echocardiogram 2/2022 ejection fraction 60%, ascending aorta 3.9 cm, minimal mitral regurgitation\par US abd 2/2022 no AAA\par \par Her mother has history of carotid disease and prior stroke.

## 2022-12-15 ENCOUNTER — APPOINTMENT (OUTPATIENT)
Dept: CARDIOLOGY | Facility: CLINIC | Age: 57
End: 2022-12-15

## 2022-12-15 PROCEDURE — 93242 EXT ECG>48HR<7D RECORDING: CPT

## 2022-12-15 PROCEDURE — 93015 CV STRESS TEST SUPVJ I&R: CPT

## 2022-12-15 PROCEDURE — 93306 TTE W/DOPPLER COMPLETE: CPT

## 2022-12-27 ENCOUNTER — APPOINTMENT (OUTPATIENT)
Dept: OPHTHALMOLOGY | Facility: CLINIC | Age: 57
End: 2022-12-27

## 2022-12-28 ENCOUNTER — APPOINTMENT (OUTPATIENT)
Dept: CARDIOLOGY | Facility: CLINIC | Age: 57
End: 2022-12-28
Payer: MEDICARE

## 2022-12-28 PROCEDURE — 93244 EXT ECG>48HR<7D REV&INTERPJ: CPT

## 2023-01-09 ENCOUNTER — APPOINTMENT (OUTPATIENT)
Dept: CARDIOLOGY | Facility: CLINIC | Age: 58
End: 2023-01-09
Payer: MEDICARE

## 2023-01-09 VITALS
TEMPERATURE: 97.3 F | OXYGEN SATURATION: 98 % | BODY MASS INDEX: 27.16 KG/M2 | HEART RATE: 78 BPM | HEIGHT: 66 IN | SYSTOLIC BLOOD PRESSURE: 112 MMHG | WEIGHT: 169 LBS | DIASTOLIC BLOOD PRESSURE: 80 MMHG

## 2023-01-09 DIAGNOSIS — R53.81 OTHER MALAISE: ICD-10-CM

## 2023-01-09 DIAGNOSIS — I47.1 SUPRAVENTRICULAR TACHYCARDIA: ICD-10-CM

## 2023-01-09 DIAGNOSIS — R53.83 OTHER MALAISE: ICD-10-CM

## 2023-01-09 PROCEDURE — 99214 OFFICE O/P EST MOD 30 MIN: CPT

## 2023-01-09 RX ORDER — MAGNESIUM OXIDE/MAG AA CHELATE 300 MG
300 CAPSULE ORAL
Refills: 0 | Status: ACTIVE | COMMUNITY

## 2023-01-09 RX ORDER — ZOLPIDEM TARTRATE 12.5 MG/1
12.5 TABLET, COATED ORAL
Refills: 0 | Status: ACTIVE | COMMUNITY

## 2023-01-09 RX ORDER — ASCORBIC ACID 500 MG
TABLET ORAL
Refills: 0 | Status: ACTIVE | COMMUNITY

## 2023-01-09 NOTE — ASSESSMENT
[FreeTextEntry1] : SEAN SHIN is a 57 year old F who presents today Jan 09, 2023 with the above history and the following active issues: \par \par ACP\par Palps\par Post COVID fatigue\par Echo no pericardial disease, normal LV systolic function \par ETT no evidence of ischemia, normal  CV response. \par Monitor no sig arrhythmia. \par Prior CTA normal cors\par All recent CV testing reviewed with patient indicating that CP is unlikely of cardiac etiology. Emphasized the importance of followup with PCP to investigate alternative etiologies of symptoms.  \par \par Mild MR\par Mild asc ao diltation, 4.1cm\par Surveillance monitoring advised annually\par BP well controlled\par \par HLD \par Likely familial\par ! High HDL.\par Adjusting her diet\par Long term benefit of statin rx reviewed. She states her PCP is starting her on statin and will manage w their office. \par \par PSVT: Very brief on prior holter monitor.  Now off  Metoprolol. Avoid triggers. Stress reduction. \par \par Asthma: Keep f/u allergist, PCP. \par \par Annual cardiac followup unless otherwise indicated. \par Any questions and concerns were addressed and resolved. \par \par Sincerely,\par \par FOUZIA Musa\par Patients history, testing, and plan reviewed with supervising MD: Dr. Alpesh Toledo

## 2023-01-09 NOTE — REVIEW OF SYSTEMS
[Feeling Fatigued] : feeling fatigued [Chest Discomfort] : chest discomfort [Palpitations] : palpitations [Negative] : Heme/Lymph

## 2023-01-09 NOTE — HISTORY OF PRESENT ILLNESS
[FreeTextEntry1] : SEAN SHIN  is a 57 year old  F\par  \par with a past medical history of Lyme disease, hyperlipidemia, GERD, asthma, anxiety, NADEEM on CPAP, and brief PSVT. \par Told of heart murmur at [employer]. \par \par Remote evaluation with Dr. Lorenzo, symptoms attributed to lyme disease.\par Incidental finding of thyroid nodule for which she is following with Dr. Guevara.  Not sig\par \par There is no prior history of a clinical myocardial infarction, coronary revascularization. \par There is no history of symptomatic congestive heart failure rheumatic heart disease\par There is no history of symptomatic arrhythmias including atrial fibrillation.\par \par walks up to 4 miles every night\par COVID Sept 2022 recovered at home without txt, main sx were wheezing resolved with albuterol, fatigue and body aches. \par She now has persistent fatigue, random L sided CP not exertional, and irreg heart beat like a "gurgle" every so often. \par Her allergist reportedly did lung test which showed stable asthma and pt was told to see cardio. \par \par Labs 11/2022  HDL 78 \par EKG 11/28/22 normal sinus rhythm \par Monitor Dec 2022 normal sinus rhythm avg 70 bpm, rare ectopy, brief AT \par Echo Dec 2022 normal LV systolic function mild VHD, aortic root 3.6cm, asc 4.1cm \par ETT Dec 2022 9m 24s exercise, no evidence of ischemia, DUKE score 6.5\par September 2021 hemoglobin 13.5 potassium 3.9 creatinine 0.8 TSH 1.5\par cardiac CTA 9/30/19. Calcium score of zero. normal coronary arteries. Normal resting heart structure and function. \par Carotid duplex January 30, 2019 nonobstructive disease, right thyroid nodules noted, US was performed and followup with endocrine was complete, no planned intervention.\par Echocardiogram 2/2022 ejection fraction 60%, ascending aorta 3.9 cm, minimal mitral regurgitation\par US abd 2/2022 no AAA\par \par Her mother has history of carotid disease and prior stroke. \par Her brothers are no cholesterol meds.

## 2023-01-09 NOTE — ADDENDUM
[FreeTextEntry1] : Please note the patient was reviewed with NP Shanon Morgan.\par I was physically present during the service of the patient.\par I was directly involved in the management plan and recommendations of the care provided to the patient. \par I personally reviewed the history and physical examination as documented by the NP above.\par \par

## 2023-07-24 ENCOUNTER — OFFICE (OUTPATIENT)
Dept: URBAN - METROPOLITAN AREA CLINIC 8 | Facility: CLINIC | Age: 58
Setting detail: OPHTHALMOLOGY
End: 2023-07-24
Payer: MEDICARE

## 2023-07-24 DIAGNOSIS — H35.353: ICD-10-CM

## 2023-07-24 DIAGNOSIS — H20.00: ICD-10-CM

## 2023-07-24 DIAGNOSIS — Z96.1: ICD-10-CM

## 2023-07-24 DIAGNOSIS — H16.223: ICD-10-CM

## 2023-07-24 DIAGNOSIS — H43.393: ICD-10-CM

## 2023-07-24 DIAGNOSIS — H26.493: ICD-10-CM

## 2023-07-24 DIAGNOSIS — H35.373: ICD-10-CM

## 2023-07-24 DIAGNOSIS — H11.153: ICD-10-CM

## 2023-07-24 PROCEDURE — 92014 COMPRE OPH EXAM EST PT 1/>: CPT | Performed by: OPHTHALMOLOGY

## 2023-07-24 PROCEDURE — 92134 CPTRZ OPH DX IMG PST SGM RTA: CPT | Performed by: OPHTHALMOLOGY

## 2023-07-24 ASSESSMENT — REFRACTION_MANIFEST
OD_ADD: +2.75
OD_SPHERE: +0.25
OD_VA2: 20/30(J2)
OD_AXIS: 085
OS_CYLINDER: -0.50
OS_SPHERE: +0.50
OS_ADD: +2.75
OS_VA1: 20/30-1
OS_ADD: +2.75
OU_VA: 20/25-1
OD_ADD: +2.75
OD_CYLINDER: -0.25
OD_VA1: 20/60
OS_VA1: 20/25
OS_CYLINDER: -0.50
OS_VA2: 20/30(J2)
OD_CYLINDER: -0.25
OD_AXIS: 085
OS_AXIS: 110
OS_SPHERE: +0.50
OD_VA1: 20/40
OD_SPHERE: +0.25
OS_AXIS: 110

## 2023-07-24 ASSESSMENT — REFRACTION_AUTOREFRACTION
OS_SPHERE: +0.25
OD_CYLINDER: -1.25
OD_SPHERE: +0.50
OS_CYLINDER: -0.75
OS_AXIS: 115
OD_AXIS: 085

## 2023-07-24 ASSESSMENT — AXIALLENGTH_DERIVED
OD_AL: 23.3673
OS_AL: 23.4124
OS_AL: 23.2694
OS_AL: 23.2694
OD_AL: 23.2721
OD_AL: 23.2721

## 2023-07-24 ASSESSMENT — TONOMETRY
OS_IOP_MMHG: 11
OD_IOP_MMHG: 11

## 2023-07-24 ASSESSMENT — KERATOMETRY
OS_K1POWER_DIOPTERS: 43.75
OS_K2POWER_DIOPTERS: 44.50
OD_AXISANGLE_DEGREES: 090
OD_K2POWER_DIOPTERS: 44.25
METHOD_AUTO_MANUAL: AUTO
OD_K1POWER_DIOPTERS: 44.25
OS_AXISANGLE_DEGREES: 029

## 2023-07-24 ASSESSMENT — CONFRONTATIONAL VISUAL FIELD TEST (CVF)
OS_FINDINGS: FULL
OD_FINDINGS: FULL

## 2023-07-24 ASSESSMENT — SPHEQUIV_DERIVED
OD_SPHEQUIV: -0.125
OS_SPHEQUIV: 0.25
OS_SPHEQUIV: -0.125
OD_SPHEQUIV: 0.125
OD_SPHEQUIV: 0.125
OS_SPHEQUIV: 0.25

## 2023-07-24 ASSESSMENT — VISUAL ACUITY
OD_BCVA: 20/40
OS_BCVA: 20/50+

## 2024-01-02 NOTE — HISTORY OF PRESENT ILLNESS
[FreeTextEntry1] : SEAN SHIN  is a 58 year old  F   with a past medical history of Lyme disease, hyperlipidemia, GERD, asthma, anxiety, NADEEM on CPAP, and brief PSVT.  Told of heart murmur at [employer].   Remote evaluation with Dr. Lorenzo, symptoms attributed to lyme disease. Incidental finding of thyroid nodule for which she is following with Dr. Guevara.  Not sig  There is no prior history of a clinical myocardial infarction, coronary revascularization.  There is no history of symptomatic congestive heart failure rheumatic heart disease There is no history of symptomatic arrhythmias including atrial fibrillation.  walks up to 4 miles every night COVID Sept 2022 recovered at home without txt, main sx were wheezing resolved with albuterol, fatigue and body aches.  She now has persistent fatigue, random L sided CP not exertional, and irreg heart beat like a "gurgle" every so often.  Her allergist reportedly did lung test which showed stable asthma and pt was told to see cardio.   Labs 11/2022  HDL 78  EKG 11/28/22 normal sinus rhythm  Monitor Dec 2022 normal sinus rhythm avg 70 bpm, rare ectopy, brief AT  Echo Dec 2022 normal LV systolic function mild VHD, aortic root 3.6cm, asc 4.1cm  ETT Dec 2022 9m 24s exercise, no evidence of ischemia, DUKE score 6.5 September 2021 hemoglobin 13.5 potassium 3.9 creatinine 0.8 TSH 1.5 cardiac CTA 9/30/19. Calcium score of zero. normal coronary arteries. Normal resting heart structure and function.  Carotid duplex January 30, 2019 nonobstructive disease, right thyroid nodules noted, US was performed and followup with endocrine was complete, no planned intervention. Echocardiogram 2/2022 ejection fraction 60%, ascending aorta 3.9 cm, minimal mitral regurgitation US abd 2/2022 no AAA  Her mother has history of carotid disease and prior stroke.  Her brothers are no cholesterol meds.

## 2024-01-24 ENCOUNTER — OFFICE (OUTPATIENT)
Dept: URBAN - METROPOLITAN AREA CLINIC 8 | Facility: CLINIC | Age: 59
Setting detail: OPHTHALMOLOGY
End: 2024-01-24
Payer: MEDICARE

## 2024-01-24 VITALS — HEIGHT: 55 IN

## 2024-01-24 DIAGNOSIS — H43.393: ICD-10-CM

## 2024-01-24 DIAGNOSIS — H35.373: ICD-10-CM

## 2024-01-24 DIAGNOSIS — Z96.1: ICD-10-CM

## 2024-01-24 DIAGNOSIS — H20.00: ICD-10-CM

## 2024-01-24 DIAGNOSIS — H11.153: ICD-10-CM

## 2024-01-24 DIAGNOSIS — H26.493: ICD-10-CM

## 2024-01-24 DIAGNOSIS — H16.223: ICD-10-CM

## 2024-01-24 PROCEDURE — 92134 CPTRZ OPH DX IMG PST SGM RTA: CPT | Performed by: OPHTHALMOLOGY

## 2024-01-24 PROCEDURE — 92012 INTRM OPH EXAM EST PATIENT: CPT | Performed by: OPHTHALMOLOGY

## 2024-01-24 ASSESSMENT — REFRACTION_MANIFEST
OD_AXIS: 085
OD_AXIS: 085
OD_SPHERE: +0.25
OS_VA2: 20/30(J2)
OD_VA2: 20/30(J2)
OS_SPHERE: +0.50
OD_VA1: 20/40
OD_CYLINDER: -1.00
OU_VA: 20/25-1
OS_AXIS: 110
OS_ADD: +2.75
OS_AXIS: 105
OS_SPHERE: +0.25
OD_CYLINDER: -0.25
OS_CYLINDER: -1.00
OS_ADD: +2.75
OU_VA: 20/30+2
OD_ADD: +2.75
OS_CYLINDER: -0.50
OD_SPHERE: PLANO
OS_VA1: 20/30-1
OS_VA1: 20/30+2
OD_ADD: +2.75
OD_VA1: 20/25-2

## 2024-01-24 ASSESSMENT — REFRACTION_AUTOREFRACTION
OS_AXIS: 105
OD_CYLINDER: -1.00
OS_CYLINDER: -1.00
OD_AXIS: 087
OS_SPHERE: +0.25
OD_SPHERE: +0.25

## 2024-01-24 ASSESSMENT — SPHEQUIV_DERIVED
OS_SPHEQUIV: -0.25
OS_SPHEQUIV: -0.25
OS_SPHEQUIV: 0.25
OD_SPHEQUIV: -0.25
OD_SPHEQUIV: 0.125

## 2024-01-24 ASSESSMENT — CONFRONTATIONAL VISUAL FIELD TEST (CVF)
OD_FINDINGS: FULL
OS_FINDINGS: FULL

## 2024-02-14 ENCOUNTER — APPOINTMENT (OUTPATIENT)
Dept: CARDIOLOGY | Facility: CLINIC | Age: 59
End: 2024-02-14
Payer: MEDICARE

## 2024-02-14 VITALS
BODY MASS INDEX: 27.32 KG/M2 | OXYGEN SATURATION: 97 % | WEIGHT: 170 LBS | HEIGHT: 66 IN | SYSTOLIC BLOOD PRESSURE: 104 MMHG | HEART RATE: 79 BPM | DIASTOLIC BLOOD PRESSURE: 64 MMHG

## 2024-02-14 DIAGNOSIS — F98.8 OTHER SPECIFIED BEHAVIORAL AND EMOTIONAL DISORDERS WITH ONSET USUALLY OCCURRING IN CHILDHOOD AND ADOLESCENCE: ICD-10-CM

## 2024-02-14 DIAGNOSIS — I77.810 THORACIC AORTIC ECTASIA: ICD-10-CM

## 2024-02-14 LAB — HBA1C MFR BLD HPLC: 5.6

## 2024-02-14 PROCEDURE — 99215 OFFICE O/P EST HI 40 MIN: CPT

## 2024-02-14 RX ORDER — MELOXICAM 15 MG/1
15 TABLET ORAL DAILY
Refills: 0 | Status: ACTIVE | COMMUNITY

## 2024-02-14 NOTE — HISTORY OF PRESENT ILLNESS
[FreeTextEntry1] : Labs 11/2022  HDL 78  EKG 11/28/22 normal sinus rhythm  Monitor Dec 2022 normal sinus rhythm avg 70 bpm, rare ectopy, brief AT  Echo Dec 2022 normal LV systolic function mild VHD, aortic root 3.6cm, asc 4.1cm  ETT Dec 2022 9m 24s exercise, no evidence of ischemia, DUKE score 6.5 September 2021 hemoglobin 13.5 potassium 3.9 creatinine 0.8 TSH 1.5 cardiac CTA 9/30/19. Calcium score of zero. normal coronary arteries. Normal resting heart structure and function.  Carotid duplex January 30, 2019 nonobstructive disease, right thyroid nodules noted, US was performed and followup with endocrine was complete, no planned intervention. Echocardiogram 2/2022 ejection fraction 60%, ascending aorta 3.9 cm, minimal mitral regurgitation US abd 2/2022 no AAA  Her mother has history of carotid disease and prior stroke.  Her brothers are no cholesterol meds.

## 2024-02-14 NOTE — DISCUSSION/SUMMARY
[FreeTextEntry1] : Stephanie is a 58-year-old female with medical history detailed above and active medical issues including:  - Recurrent chest pain concerning for angina, multiple CAD risk factors.  Coronary CTA and coronary calcium score ordered to assess for obstructive CAD and risk stratification.  Echocardiogram ordered to evaluate for structural heart disease, carotid and abdominal ultrasound to evaluate for PAD with cardiology follow-up.  - Hyperlipidemia, marked elevation , statin myopathy, start Repatha with repeat labs ordered  - TAA asc aorta 4.1cm echo 12/22, coronary CTA with ascending aorta measurement  - NADEEM using CPAP  - Neck injury multiple surgeries chronic back pain  - COVID 12/28/23 long-haul symptoms  - ADHD  Advised patient to follow active lifestyle with regular cardiovascular exercise. Patient educated on heart healthy diet. Recommend increased oral hydration with electrolyte supplement drinks, avoid excess alcohol and caffeine.  Patient is aware to call with any symptoms or concerns.   Cardiology follow-up after noninvasive testing.  Stephanie will follow-up with Dr. Strong for primary care  Total time spent 45 minutes, reviewing of test results, chart information, patient discussion, physical exam and completion of chart documentation.

## 2024-02-14 NOTE — REASON FOR VISIT
[Other: ____] : [unfilled] [FreeTextEntry1] : Stephanie is a 58-year-old female with history of hyperlipidemia, PSVT, NADEEM using CPAP, atypical chest pain, thyroid nodule, COVID 2/20/2023 with long-haul symptoms, ADD, spinal injury surgery, TAA asc aorta 4.1cm echo 12/22,   chronic back pain.  No history of CAD, MI, revascularization, VHD, CHF, TIA, CVA, diabetes, PVD, DVT, PE,  AF.  Patient has dyspnea with moderate exertion.  Patient has recurrent chest pain tightness nonradiating nonreproducible occurring at random times rest and exertion.  Cardiovascular review of symptoms is negative for palpitations, dizziness or syncope.  No PND or orthopnea leg edema.  No bleeding or black stool.  No exercise routine.  Patient is walking 15 minutes on occasion.  Patient is on disability neck injury working  for [employer]  Exercise stress echo Dec 2022, normal LVEF with normal exercise wall motion, nonischemic EXTR response, no chest pain, 90% MPHR, 9 minutes Joaquin protocol.  Echocardiogram Dec 2023 LVEF 60 to 65%, mild MR and TR  Zio patch 1 week heart monitor Dec 2022, sinus rhythm average heart rate 70, brief SVT

## 2024-02-28 ENCOUNTER — RX ONLY (RX ONLY)
Age: 59
End: 2024-02-28

## 2024-02-28 ENCOUNTER — OFFICE (OUTPATIENT)
Dept: URBAN - METROPOLITAN AREA CLINIC 8 | Facility: CLINIC | Age: 59
Setting detail: OPHTHALMOLOGY
End: 2024-02-28
Payer: MEDICARE

## 2024-02-28 DIAGNOSIS — H26.492: ICD-10-CM

## 2024-02-28 PROCEDURE — 66821 AFTER CATARACT LASER SURGERY: CPT | Mod: LT | Performed by: OPHTHALMOLOGY

## 2024-02-28 ASSESSMENT — SPHEQUIV_DERIVED
OS_SPHEQUIV: 0.25
OD_SPHEQUIV: 0.125
OS_SPHEQUIV: -0.25
OD_SPHEQUIV: -0.25
OS_SPHEQUIV: -0.25

## 2024-02-28 ASSESSMENT — REFRACTION_MANIFEST
OD_VA1: 20/40
OS_ADD: +2.75
OD_AXIS: 085
OS_SPHERE: +0.50
OD_CYLINDER: -0.25
OS_SPHERE: +0.25
OD_CYLINDER: -1.00
OD_SPHERE: PLANO
OD_VA2: 20/30(J2)
OD_SPHERE: +0.25
OD_VA1: 20/25-2
OS_CYLINDER: -1.00
OS_VA1: 20/30+2
OU_VA: 20/30+2
OD_AXIS: 085
OS_CYLINDER: -0.50
OD_ADD: +2.75
OD_ADD: +2.75
OS_AXIS: 110
OS_ADD: +2.75
OU_VA: 20/25-1
OS_VA2: 20/30(J2)
OS_VA1: 20/30-1
OS_AXIS: 105

## 2024-02-28 ASSESSMENT — REFRACTION_AUTOREFRACTION
OS_AXIS: 105
OD_CYLINDER: -1.00
OD_SPHERE: +0.25
OS_SPHERE: +0.25
OD_AXIS: 087
OS_CYLINDER: -1.00

## 2024-02-28 ASSESSMENT — CONFRONTATIONAL VISUAL FIELD TEST (CVF)
OS_FINDINGS: FULL
OD_FINDINGS: FULL

## 2024-03-07 RX ORDER — EVOLOCUMAB 140 MG/ML
140 INJECTION, SOLUTION SUBCUTANEOUS
Qty: 6 | Refills: 1 | Status: ACTIVE | COMMUNITY
Start: 2024-02-14

## 2024-03-11 ENCOUNTER — APPOINTMENT (OUTPATIENT)
Dept: CARDIOLOGY | Facility: CLINIC | Age: 59
End: 2024-03-11

## 2024-03-21 ENCOUNTER — APPOINTMENT (OUTPATIENT)
Dept: CARDIOLOGY | Facility: CLINIC | Age: 59
End: 2024-03-21
Payer: MEDICARE

## 2024-03-21 PROCEDURE — 93880 EXTRACRANIAL BILAT STUDY: CPT

## 2024-03-21 PROCEDURE — 93979 VASCULAR STUDY: CPT

## 2024-03-21 PROCEDURE — 93306 TTE W/DOPPLER COMPLETE: CPT

## 2024-04-03 ENCOUNTER — APPOINTMENT (OUTPATIENT)
Dept: CARDIOLOGY | Facility: CLINIC | Age: 59
End: 2024-04-03
Payer: MEDICARE

## 2024-04-03 VITALS
HEART RATE: 65 BPM | OXYGEN SATURATION: 96 % | HEIGHT: 66 IN | DIASTOLIC BLOOD PRESSURE: 84 MMHG | WEIGHT: 175 LBS | SYSTOLIC BLOOD PRESSURE: 126 MMHG | BODY MASS INDEX: 28.12 KG/M2

## 2024-04-03 DIAGNOSIS — R94.31 ABNORMAL ELECTROCARDIOGRAM [ECG] [EKG]: ICD-10-CM

## 2024-04-03 DIAGNOSIS — R00.2 PALPITATIONS: ICD-10-CM

## 2024-04-03 DIAGNOSIS — G47.33 OBSTRUCTIVE SLEEP APNEA (ADULT) (PEDIATRIC): ICD-10-CM

## 2024-04-03 DIAGNOSIS — E78.5 HYPERLIPIDEMIA, UNSPECIFIED: ICD-10-CM

## 2024-04-03 DIAGNOSIS — R07.9 CHEST PAIN, UNSPECIFIED: ICD-10-CM

## 2024-04-03 DIAGNOSIS — R01.1 CARDIAC MURMUR, UNSPECIFIED: ICD-10-CM

## 2024-04-03 PROCEDURE — 99215 OFFICE O/P EST HI 40 MIN: CPT

## 2024-04-03 PROCEDURE — G2211 COMPLEX E/M VISIT ADD ON: CPT

## 2024-04-03 NOTE — ASSESSMENT
[FreeTextEntry1] : Patient has medical history detailed above and active medical issues including:  - Recurrent palpitations associated dizziness and fatigue.  Nonobstructive coronary CTA with low risk coronary calcium score 0, normal LVEF echo March 2024.  Rhythm express 1 week heart monitor started today.  Recommended increased oral hydration with electrolyte suppliment drinks, avoid caffeine and alcohol.  - Hyperlipidemia , statin myopathy started on Repatha insurance coverage forms completed today.  Repeat labs ordered.  - Asending aortic aneurysm 4.0 cm on CTA March 2024  - NADEEM using CPAP  - ADHD  Advised patient to follow active lifestyle with regular cardiovascular exercise. Patient educated on heart healthy diet. Recommend increased oral hydration with electrolyte supplement drinks, avoid excess alcohol and caffeine.  Patient is aware to call with any symptoms or concerns.   Cardiology follow-up 6 months.   States he will follow-up with Dr. Ander Strong for primary care.  Total time spent 45 minutes, reviewing of test results, chart information, patient discussion, physical exam and completion of chart documentation.

## 2024-04-03 NOTE — HISTORY OF PRESENT ILLNESS
[FreeTextEntry1] : Stephanie is a 59-year-old female with history of hyperlipidemia, statin myopathy, ADHD, fibromyalgia, thyroid nodule, palpitations, PSVT, chronic fatigue.  No history of CAD, MI, revascularization, VHD, CHF, TIA, CVA, diabetes, PVD, DVT, PE, arrhythmia, AF.  Patient has palpitations fluttering slow heart rate associated dizziness, no syncope.  Cardiovascular review of symptoms is negative for exertional chest pain, dyspnea.  No PND or orthopnea leg edema.  No bleeding or black stool.  Coronary CTA March 2024 close coronary calcium score 0, nonobstructive coronaries, ascending aorta 4.0 cm.  Echocardiogram March 2024 LVEF 60%, mild TR.  Labs Feb 2024 normal BMP, LFT, , total cholesterol 267, HDL 68, triglyceride 106  Labs 11/2022  HDL 78  EKG 11/28/22 normal sinus rhythm  Monitor Dec 2022 normal sinus rhythm avg 70 bpm, rare ectopy, brief AT  Echo Dec 2022 normal LV systolic function mild VHD, aortic root 3.6cm, asc 4.1cm  ETT Dec 2022 9m 24s exercise, no evidence of ischemia, DUKE score 6.5 September 2021 hemoglobin 13.5 potassium 3.9 creatinine 0.8 TSH 1.5 cardiac CTA 9/30/19. Calcium score of zero. normal coronary arteries. Normal resting heart structure and function.  Carotid duplex January 30, 2019 nonobstructive disease, right thyroid nodules noted, US was performed and followup with endocrine was complete, no planned intervention. Echocardiogram 2/2022 ejection fraction 60%, ascending aorta 3.9 cm, minimal mitral regurgitation US abd 2/2022 no AAA  Her mother has history of carotid disease and prior stroke.  Her brothers are not on cholesterol meds.

## 2024-04-23 PROBLEM — H25.12 CATARACT; LEFT EYE: Status: ACTIVE | Noted: 2024-04-23

## 2024-05-15 ENCOUNTER — APPOINTMENT (OUTPATIENT)
Dept: CARDIOLOGY | Facility: CLINIC | Age: 59
End: 2024-05-15

## 2024-05-30 ENCOUNTER — NON-APPOINTMENT (OUTPATIENT)
Age: 59
End: 2024-05-30

## 2024-06-24 ENCOUNTER — OFFICE (OUTPATIENT)
Dept: URBAN - METROPOLITAN AREA CLINIC 8 | Facility: CLINIC | Age: 59
Setting detail: OPHTHALMOLOGY
End: 2024-06-24
Payer: MEDICARE

## 2024-06-24 DIAGNOSIS — Z96.1: ICD-10-CM

## 2024-06-24 DIAGNOSIS — H11.153: ICD-10-CM

## 2024-06-24 DIAGNOSIS — H52.4: ICD-10-CM

## 2024-06-24 DIAGNOSIS — H26.493: ICD-10-CM

## 2024-06-24 DIAGNOSIS — H20.00: ICD-10-CM

## 2024-06-24 DIAGNOSIS — H35.373: ICD-10-CM

## 2024-06-24 DIAGNOSIS — H16.223: ICD-10-CM

## 2024-06-24 PROCEDURE — 99213 OFFICE O/P EST LOW 20 MIN: CPT | Performed by: OPHTHALMOLOGY

## 2024-06-24 PROCEDURE — 92015 DETERMINE REFRACTIVE STATE: CPT | Performed by: OPHTHALMOLOGY

## 2024-06-24 ASSESSMENT — CONFRONTATIONAL VISUAL FIELD TEST (CVF)
OS_FINDINGS: FULL
OD_FINDINGS: FULL

## 2024-10-23 ENCOUNTER — APPOINTMENT (OUTPATIENT)
Dept: VASCULAR SURGERY | Facility: CLINIC | Age: 59
End: 2024-10-23

## 2024-10-23 PROCEDURE — ZZZZZ: CPT

## 2024-10-26 PROBLEM — H25.12 CATARACT; LEFT EYE: Status: ACTIVE | Noted: 2024-10-26

## 2024-10-31 ENCOUNTER — APPOINTMENT (OUTPATIENT)
Dept: VASCULAR SURGERY | Facility: CLINIC | Age: 59
End: 2024-10-31
Payer: MEDICARE

## 2024-10-31 VITALS
HEART RATE: 65 BPM | DIASTOLIC BLOOD PRESSURE: 78 MMHG | RESPIRATION RATE: 14 BRPM | WEIGHT: 175 LBS | BODY MASS INDEX: 28.12 KG/M2 | SYSTOLIC BLOOD PRESSURE: 120 MMHG | HEIGHT: 66 IN | OXYGEN SATURATION: 99 %

## 2024-10-31 DIAGNOSIS — I83.893 VARICOSE VEINS OF BILATERAL LOWER EXTREMITIES WITH OTHER COMPLICATIONS: ICD-10-CM

## 2024-10-31 PROCEDURE — 93970 EXTREMITY STUDY: CPT

## 2024-10-31 PROCEDURE — 99204 OFFICE O/P NEW MOD 45 MIN: CPT

## 2024-11-27 RX ORDER — LIDOCAINE HYDROCHLORIDE 10 MG/ML
1 INJECTION, SOLUTION INFILTRATION; PERINEURAL
Qty: 500 | Refills: 0 | Status: ACTIVE | COMMUNITY
Start: 2024-11-27 | End: 1900-01-01

## 2024-11-27 RX ORDER — SODIUM BICARBONATE 84 MG/ML
8.4 INJECTION, SOLUTION INTRAVENOUS
Qty: 5 | Refills: 0 | Status: ACTIVE | COMMUNITY
Start: 2024-11-27 | End: 1900-01-01

## 2024-12-06 ENCOUNTER — APPOINTMENT (OUTPATIENT)
Dept: VASCULAR SURGERY | Facility: CLINIC | Age: 59
End: 2024-12-06
Payer: MEDICARE

## 2024-12-06 PROCEDURE — 36475 ENDOVENOUS RF 1ST VEIN: CPT | Mod: RT

## 2024-12-06 PROCEDURE — 37765 STAB PHLEB VEINS XTR 10-20: CPT | Mod: RT

## 2024-12-12 ENCOUNTER — APPOINTMENT (OUTPATIENT)
Dept: VASCULAR SURGERY | Facility: CLINIC | Age: 59
End: 2024-12-12
Payer: MEDICARE

## 2024-12-12 DIAGNOSIS — I83.893 VARICOSE VEINS OF BILATERAL LOWER EXTREMITIES WITH OTHER COMPLICATIONS: ICD-10-CM

## 2024-12-12 PROCEDURE — 99024 POSTOP FOLLOW-UP VISIT: CPT

## 2024-12-12 PROCEDURE — 93971 EXTREMITY STUDY: CPT

## 2025-01-23 ENCOUNTER — APPOINTMENT (OUTPATIENT)
Dept: VASCULAR SURGERY | Facility: CLINIC | Age: 60
End: 2025-01-23

## 2025-03-14 ENCOUNTER — OFFICE (OUTPATIENT)
Dept: URBAN - METROPOLITAN AREA CLINIC 8 | Facility: CLINIC | Age: 60
Setting detail: OPHTHALMOLOGY
End: 2025-03-14
Payer: MEDICARE

## 2025-03-14 DIAGNOSIS — H40.013: ICD-10-CM

## 2025-03-14 DIAGNOSIS — H26.493: ICD-10-CM

## 2025-03-14 DIAGNOSIS — H35.373: ICD-10-CM

## 2025-03-14 PROBLEM — H35.40 PERIPAPILLARY ATROPHY ; LEFT EYE: Status: ACTIVE | Noted: 2025-03-14

## 2025-03-14 PROBLEM — H35.89 OTHER SPECIFIED RETINAL DISORDERS: Status: ACTIVE | Noted: 2025-03-14

## 2025-03-14 PROBLEM — H43.393 VITREOUS FLOATERS; BOTH EYES: Status: ACTIVE | Noted: 2025-03-14

## 2025-03-14 PROBLEM — H11.153 PINGUECULA; BOTH EYES: Status: ACTIVE | Noted: 2025-03-14

## 2025-03-14 PROCEDURE — 92014 COMPRE OPH EXAM EST PT 1/>: CPT | Performed by: OPTOMETRIST

## 2025-03-14 PROCEDURE — 92134 CPTRZ OPH DX IMG PST SGM RTA: CPT | Performed by: OPTOMETRIST

## 2025-03-14 ASSESSMENT — REFRACTION_MANIFEST
OS_ADD: +2.50
OS_ADD: +2.75
OD_SPHERE: +0.50
OD_CYLINDER: -1.00
OS_SPHERE: PLANO
OS_CYLINDER: -0.75
OD_CYLINDER: -1.25
OD_SPHERE: PLANO
OD_VA1: 20/25-2
OU_VA: 20/30+2
OS_VA1: 20/30+2
OS_CYLINDER: -0.75
OS_VA2: 20/30(J2)
OS_AXIS: 103
OU_VA: 20/40
OD_AXIS: 082
OS_AXIS: 115
OS_VA1: 20/40
OD_ADD: +2.50
OD_VA2: 20/30(J2)
OS_SPHERE: +0.25
OD_VA1: 20/40
OD_ADD: +2.75
OD_AXIS: 090

## 2025-03-14 ASSESSMENT — REFRACTION_CURRENTRX
OD_OVR_VA: 20/
OD_SPHERE: +3.00
OS_AXIS: 096
OS_OVR_VA: 20/
OD_CYLINDER: -1.25
OS_SPHERE: +3.00
OS_VPRISM_DIRECTION: BF
OD_OVR_VA: 20/
OD_VPRISM_DIRECTION: BF
OD_AXIS: 089
OS_SPHERE: +1.25
OD_ADD: +2.00
OS_CYLINDER: -1.00
OS_CYLINDER: -1.25
OS_OVR_VA: 20/
OD_AXIS: 087
OS_ADD: +2.00
OS_AXIS: 111
OD_CYLINDER: -1.25
OD_SPHERE: +1.50

## 2025-03-14 ASSESSMENT — CONFRONTATIONAL VISUAL FIELD TEST (CVF)
OS_FINDINGS: FULL
OD_FINDINGS: FULL

## 2025-03-14 ASSESSMENT — REFRACTION_AUTOREFRACTION
OS_AXIS: 103
OS_SPHERE: PLANO
OD_AXIS: 082
OS_CYLINDER: -0.75
OD_SPHERE: PLANO
OD_CYLINDER: -1.00

## 2025-03-14 ASSESSMENT — KERATOMETRY
OD_AXISANGLE_DEGREES: 173
OS_AXISANGLE_DEGREES: 023
OS_K2POWER_DIOPTERS: 44.50
OS_K1POWER_DIOPTERS: 43.50
METHOD_AUTO_MANUAL: AUTO
OD_K1POWER_DIOPTERS: 43.75
OD_K2POWER_DIOPTERS: 44.50

## 2025-03-14 ASSESSMENT — VISUAL ACUITY
OS_BCVA: 20/60
OD_BCVA: 20/60

## 2025-04-02 PROBLEM — R06.02 SHORTNESS OF BREATH ON EXERTION: Status: RESOLVED | Noted: 2019-01-15 | Resolved: 2025-04-02

## 2025-04-07 ENCOUNTER — NON-APPOINTMENT (OUTPATIENT)
Age: 60
End: 2025-04-07

## 2025-04-09 ENCOUNTER — APPOINTMENT (OUTPATIENT)
Dept: CARDIOLOGY | Facility: CLINIC | Age: 60
End: 2025-04-09
Payer: MEDICARE

## 2025-04-09 VITALS
BODY MASS INDEX: 27.8 KG/M2 | DIASTOLIC BLOOD PRESSURE: 72 MMHG | WEIGHT: 173 LBS | HEIGHT: 66 IN | HEART RATE: 60 BPM | OXYGEN SATURATION: 97 % | SYSTOLIC BLOOD PRESSURE: 98 MMHG

## 2025-04-09 DIAGNOSIS — R01.1 CARDIAC MURMUR, UNSPECIFIED: ICD-10-CM

## 2025-04-09 DIAGNOSIS — F98.8 OTHER SPECIFIED BEHAVIORAL AND EMOTIONAL DISORDERS WITH ONSET USUALLY OCCURRING IN CHILDHOOD AND ADOLESCENCE: ICD-10-CM

## 2025-04-09 DIAGNOSIS — G47.33 OBSTRUCTIVE SLEEP APNEA (ADULT) (PEDIATRIC): ICD-10-CM

## 2025-04-09 DIAGNOSIS — R00.2 PALPITATIONS: ICD-10-CM

## 2025-04-09 DIAGNOSIS — I47.10 SUPRAVENTRICULAR TACHYCARDIA, UNSPECIFIED: ICD-10-CM

## 2025-04-09 DIAGNOSIS — R07.9 CHEST PAIN, UNSPECIFIED: ICD-10-CM

## 2025-04-09 DIAGNOSIS — J45.909 UNSPECIFIED ASTHMA, UNCOMPLICATED: ICD-10-CM

## 2025-04-09 DIAGNOSIS — I77.810 THORACIC AORTIC ECTASIA: ICD-10-CM

## 2025-04-09 DIAGNOSIS — R94.31 ABNORMAL ELECTROCARDIOGRAM [ECG] [EKG]: ICD-10-CM

## 2025-04-09 PROCEDURE — 99215 OFFICE O/P EST HI 40 MIN: CPT

## 2025-04-09 RX ORDER — LEVALBUTEROL TARTRATE 45 UG/1
45 AEROSOL, METERED ORAL
Refills: 0 | Status: ACTIVE | COMMUNITY

## 2025-05-28 ENCOUNTER — APPOINTMENT (OUTPATIENT)
Dept: CARDIOLOGY | Facility: CLINIC | Age: 60
End: 2025-05-28
Payer: MEDICARE

## 2025-05-28 ENCOUNTER — NON-APPOINTMENT (OUTPATIENT)
Age: 60
End: 2025-05-28

## 2025-05-28 VITALS
OXYGEN SATURATION: 97 % | WEIGHT: 178 LBS | HEIGHT: 66 IN | HEART RATE: 66 BPM | DIASTOLIC BLOOD PRESSURE: 60 MMHG | BODY MASS INDEX: 28.61 KG/M2 | SYSTOLIC BLOOD PRESSURE: 102 MMHG

## 2025-05-28 DIAGNOSIS — R01.1 CARDIAC MURMUR, UNSPECIFIED: ICD-10-CM

## 2025-05-28 DIAGNOSIS — J45.909 UNSPECIFIED ASTHMA, UNCOMPLICATED: ICD-10-CM

## 2025-05-28 DIAGNOSIS — G47.33 OBSTRUCTIVE SLEEP APNEA (ADULT) (PEDIATRIC): ICD-10-CM

## 2025-05-28 DIAGNOSIS — R00.2 PALPITATIONS: ICD-10-CM

## 2025-05-28 DIAGNOSIS — I77.810 THORACIC AORTIC ECTASIA: ICD-10-CM

## 2025-05-28 DIAGNOSIS — F98.8 OTHER SPECIFIED BEHAVIORAL AND EMOTIONAL DISORDERS WITH ONSET USUALLY OCCURRING IN CHILDHOOD AND ADOLESCENCE: ICD-10-CM

## 2025-05-28 DIAGNOSIS — R94.31 ABNORMAL ELECTROCARDIOGRAM [ECG] [EKG]: ICD-10-CM

## 2025-05-28 DIAGNOSIS — E78.5 HYPERLIPIDEMIA, UNSPECIFIED: ICD-10-CM

## 2025-05-28 DIAGNOSIS — Z01.818 ENCOUNTER FOR OTHER PREPROCEDURAL EXAMINATION: ICD-10-CM

## 2025-05-28 DIAGNOSIS — R07.9 CHEST PAIN, UNSPECIFIED: ICD-10-CM

## 2025-05-28 PROCEDURE — 99215 OFFICE O/P EST HI 40 MIN: CPT

## 2025-05-28 PROCEDURE — 93306 TTE W/DOPPLER COMPLETE: CPT

## 2025-05-28 RX ORDER — LAMOTRIGINE 50 MG/1
50 TABLET, EXTENDED RELEASE ORAL
Refills: 0 | Status: ACTIVE | COMMUNITY

## 2025-05-28 RX ORDER — GABAPENTIN 300 MG
300 TABLET ORAL 3 TIMES DAILY
Refills: 0 | Status: ACTIVE | COMMUNITY

## 2025-08-26 PROBLEM — H25.12 CATARACT; LEFT EYE: Status: ACTIVE | Noted: 2025-08-26
